# Patient Record
Sex: FEMALE | Race: BLACK OR AFRICAN AMERICAN | Employment: FULL TIME | URBAN - METROPOLITAN AREA
[De-identification: names, ages, dates, MRNs, and addresses within clinical notes are randomized per-mention and may not be internally consistent; named-entity substitution may affect disease eponyms.]

---

## 2020-09-17 ENCOUNTER — APPOINTMENT (OUTPATIENT)
Dept: CT IMAGING | Age: 50
End: 2020-09-17
Payer: COMMERCIAL

## 2020-09-17 ENCOUNTER — APPOINTMENT (OUTPATIENT)
Dept: CT IMAGING | Age: 50
DRG: 023 | End: 2020-09-17
Attending: NEUROLOGICAL SURGERY
Payer: COMMERCIAL

## 2020-09-17 ENCOUNTER — HOSPITAL ENCOUNTER (EMERGENCY)
Age: 50
Discharge: ANOTHER HOSPITAL | End: 2020-09-17
Attending: EMERGENCY MEDICINE
Payer: COMMERCIAL

## 2020-09-17 ENCOUNTER — HOSPITAL ENCOUNTER (INPATIENT)
Age: 50
LOS: 7 days | Discharge: HOME HEALTH | DRG: 023 | End: 2020-09-24
Attending: NEUROLOGICAL SURGERY | Admitting: INTERNAL MEDICINE
Payer: COMMERCIAL

## 2020-09-17 VITALS
SYSTOLIC BLOOD PRESSURE: 157 MMHG | DIASTOLIC BLOOD PRESSURE: 94 MMHG | WEIGHT: 220 LBS | BODY MASS INDEX: 36.65 KG/M2 | TEMPERATURE: 98 F | RESPIRATION RATE: 22 BRPM | OXYGEN SATURATION: 99 % | HEIGHT: 65 IN | HEART RATE: 85 BPM

## 2020-09-17 PROBLEM — I61.9 BRAIN BLEED (HCC): Status: ACTIVE | Noted: 2020-09-17

## 2020-09-17 LAB
ANION GAP SERPL CALCULATED.3IONS-SCNC: 9 MMOL/L (ref 3–16)
BASE EXCESS VENOUS: -2.2 MMOL/L (ref -3–3)
BASOPHILS ABSOLUTE: 0.1 K/UL (ref 0–0.2)
BASOPHILS RELATIVE PERCENT: 0.7 %
BUN BLDV-MCNC: 13 MG/DL (ref 7–20)
CALCIUM SERPL-MCNC: 9.1 MG/DL (ref 8.3–10.6)
CARBOXYHEMOGLOBIN: 2.2 % (ref 0–1.5)
CHLORIDE BLD-SCNC: 105 MMOL/L (ref 99–110)
CO2: 25 MMOL/L (ref 21–32)
CREAT SERPL-MCNC: 0.8 MG/DL (ref 0.6–1.1)
EOSINOPHILS ABSOLUTE: 0.1 K/UL (ref 0–0.6)
EOSINOPHILS RELATIVE PERCENT: 1.2 %
GFR AFRICAN AMERICAN: >60
GFR NON-AFRICAN AMERICAN: >60
GLUCOSE BLD-MCNC: 133 MG/DL (ref 70–99)
GLUCOSE BLD-MCNC: 138 MG/DL (ref 70–99)
HCO3 VENOUS: 21.7 MMOL/L (ref 23–29)
HCT VFR BLD CALC: 44.6 % (ref 36–48)
HEMOGLOBIN: 14.4 G/DL (ref 12–16)
INR BLD: 1.09 (ref 0.86–1.14)
LYMPHOCYTES ABSOLUTE: 3 K/UL (ref 1–5.1)
LYMPHOCYTES RELATIVE PERCENT: 36.5 %
MCH RBC QN AUTO: 27.4 PG (ref 26–34)
MCHC RBC AUTO-ENTMCNC: 32.3 G/DL (ref 31–36)
MCV RBC AUTO: 84.8 FL (ref 80–100)
METHEMOGLOBIN VENOUS: 0.2 %
MONOCYTES ABSOLUTE: 0.8 K/UL (ref 0–1.3)
MONOCYTES RELATIVE PERCENT: 10.1 %
NEUTROPHILS ABSOLUTE: 4.3 K/UL (ref 1.7–7.7)
NEUTROPHILS RELATIVE PERCENT: 51.5 %
O2 CONTENT, VEN: 18 VOL %
O2 SAT, VEN: 99 %
O2 THERAPY: ABNORMAL
PCO2, VEN: 33.8 MMHG (ref 40–50)
PDW BLD-RTO: 14.3 % (ref 12.4–15.4)
PERFORMED ON: ABNORMAL
PH VENOUS: 7.42 (ref 7.35–7.45)
PLATELET # BLD: 187 K/UL (ref 135–450)
PMV BLD AUTO: 9.4 FL (ref 5–10.5)
PO2, VEN: 128 MMHG (ref 25–40)
POTASSIUM REFLEX MAGNESIUM: 3.8 MMOL/L (ref 3.5–5.1)
PROTHROMBIN TIME: 12.6 SEC (ref 10–13.2)
RBC # BLD: 5.26 M/UL (ref 4–5.2)
SODIUM BLD-SCNC: 139 MMOL/L (ref 136–145)
TCO2 CALC VENOUS: 51 MMOL/L
TROPONIN: <0.01 NG/ML
WBC # BLD: 8.4 K/UL (ref 4–11)

## 2020-09-17 PROCEDURE — 85610 PROTHROMBIN TIME: CPT

## 2020-09-17 PROCEDURE — 99291 CRITICAL CARE FIRST HOUR: CPT

## 2020-09-17 PROCEDURE — 2580000003 HC RX 258: Performed by: STUDENT IN AN ORGANIZED HEALTH CARE EDUCATION/TRAINING PROGRAM

## 2020-09-17 PROCEDURE — 93005 ELECTROCARDIOGRAM TRACING: CPT | Performed by: EMERGENCY MEDICINE

## 2020-09-17 PROCEDURE — 82803 BLOOD GASES ANY COMBINATION: CPT

## 2020-09-17 PROCEDURE — 6370000000 HC RX 637 (ALT 250 FOR IP): Performed by: STUDENT IN AN ORGANIZED HEALTH CARE EDUCATION/TRAINING PROGRAM

## 2020-09-17 PROCEDURE — 2000000000 HC ICU R&B

## 2020-09-17 PROCEDURE — 89220 SPUTUM SPECIMEN COLLECTION: CPT

## 2020-09-17 PROCEDURE — 80048 BASIC METABOLIC PNL TOTAL CA: CPT

## 2020-09-17 PROCEDURE — 2580000003 HC RX 258

## 2020-09-17 PROCEDURE — 85025 COMPLETE CBC W/AUTO DIFF WBC: CPT

## 2020-09-17 PROCEDURE — 2580000003 HC RX 258: Performed by: EMERGENCY MEDICINE

## 2020-09-17 PROCEDURE — 36415 COLL VENOUS BLD VENIPUNCTURE: CPT

## 2020-09-17 PROCEDURE — 70450 CT HEAD/BRAIN W/O DYE: CPT

## 2020-09-17 PROCEDURE — 2500000003 HC RX 250 WO HCPCS: Performed by: EMERGENCY MEDICINE

## 2020-09-17 PROCEDURE — 6360000002 HC RX W HCPCS: Performed by: EMERGENCY MEDICINE

## 2020-09-17 PROCEDURE — 96367 TX/PROPH/DG ADDL SEQ IV INF: CPT

## 2020-09-17 PROCEDURE — 84484 ASSAY OF TROPONIN QUANT: CPT

## 2020-09-17 PROCEDURE — 2500000003 HC RX 250 WO HCPCS: Performed by: STUDENT IN AN ORGANIZED HEALTH CARE EDUCATION/TRAINING PROGRAM

## 2020-09-17 PROCEDURE — 96365 THER/PROPH/DIAG IV INF INIT: CPT

## 2020-09-17 PROCEDURE — 70496 CT ANGIOGRAPHY HEAD: CPT

## 2020-09-17 PROCEDURE — 71260 CT THORAX DX C+: CPT

## 2020-09-17 PROCEDURE — U0003 INFECTIOUS AGENT DETECTION BY NUCLEIC ACID (DNA OR RNA); SEVERE ACUTE RESPIRATORY SYNDROME CORONAVIRUS 2 (SARS-COV-2) (CORONAVIRUS DISEASE [COVID-19]), AMPLIFIED PROBE TECHNIQUE, MAKING USE OF HIGH THROUGHPUT TECHNOLOGIES AS DESCRIBED BY CMS-2020-01-R: HCPCS

## 2020-09-17 PROCEDURE — C9132 KCENTRA, PER I.U.: HCPCS | Performed by: EMERGENCY MEDICINE

## 2020-09-17 RX ORDER — LORAZEPAM 2 MG/ML
0.5 INJECTION INTRAMUSCULAR ONCE
Status: DISCONTINUED | OUTPATIENT
Start: 2020-09-17 | End: 2020-09-17 | Stop reason: HOSPADM

## 2020-09-17 RX ORDER — SODIUM CHLORIDE 9 MG/ML
50 INJECTION, SOLUTION INTRAVENOUS ONCE
Status: COMPLETED | OUTPATIENT
Start: 2020-09-17 | End: 2020-09-17

## 2020-09-17 RX ORDER — SODIUM CHLORIDE 0.9 % (FLUSH) 0.9 %
10 SYRINGE (ML) INJECTION EVERY 12 HOURS SCHEDULED
Status: DISCONTINUED | OUTPATIENT
Start: 2020-09-17 | End: 2020-09-21

## 2020-09-17 RX ORDER — ONDANSETRON 2 MG/ML
4 INJECTION INTRAMUSCULAR; INTRAVENOUS EVERY 6 HOURS PRN
Status: DISCONTINUED | OUTPATIENT
Start: 2020-09-17 | End: 2020-09-24 | Stop reason: HOSPADM

## 2020-09-17 RX ORDER — SODIUM CHLORIDE 9 MG/ML
INJECTION, SOLUTION INTRAVENOUS
Status: COMPLETED
Start: 2020-09-17 | End: 2020-09-17

## 2020-09-17 RX ORDER — ATORVASTATIN CALCIUM 80 MG/1
80 TABLET, FILM COATED ORAL NIGHTLY
Status: DISCONTINUED | OUTPATIENT
Start: 2020-09-17 | End: 2020-09-24 | Stop reason: HOSPADM

## 2020-09-17 RX ORDER — SODIUM CHLORIDE 0.9 % (FLUSH) 0.9 %
10 SYRINGE (ML) INJECTION PRN
Status: DISCONTINUED | OUTPATIENT
Start: 2020-09-17 | End: 2020-09-24 | Stop reason: HOSPADM

## 2020-09-17 RX ORDER — OXYCODONE HYDROCHLORIDE AND ACETAMINOPHEN 5; 325 MG/1; MG/1
1 TABLET ORAL EVERY 6 HOURS PRN
Status: DISCONTINUED | OUTPATIENT
Start: 2020-09-17 | End: 2020-09-22

## 2020-09-17 RX ORDER — ACETAMINOPHEN 325 MG/1
650 TABLET ORAL EVERY 4 HOURS PRN
Status: DISCONTINUED | OUTPATIENT
Start: 2020-09-17 | End: 2020-09-21

## 2020-09-17 RX ORDER — PROMETHAZINE HYDROCHLORIDE 25 MG/1
12.5 TABLET ORAL EVERY 6 HOURS PRN
Status: DISCONTINUED | OUTPATIENT
Start: 2020-09-17 | End: 2020-09-24 | Stop reason: HOSPADM

## 2020-09-17 RX ADMIN — SODIUM CHLORIDE 5 MG/HR: 9 INJECTION, SOLUTION INTRAVENOUS at 17:58

## 2020-09-17 RX ADMIN — SODIUM CHLORIDE 5 MG/HR: 9 INJECTION, SOLUTION INTRAVENOUS at 22:29

## 2020-09-17 RX ADMIN — Medication 10 ML: at 21:30

## 2020-09-17 RX ADMIN — PROTHROMBIN, COAGULATION FACTOR VII HUMAN, COAGULATION FACTOR IX HUMAN, COAGULATION FACTOR X HUMAN, PROTEIN C, PROTEIN S HUMAN, AND WATER 4990 UNITS: KIT at 18:19

## 2020-09-17 RX ADMIN — SODIUM CHLORIDE 50 ML: 9 INJECTION, SOLUTION INTRAVENOUS at 18:49

## 2020-09-17 RX ADMIN — LEVETIRACETAM 2000 MG: 100 INJECTION, SOLUTION INTRAVENOUS at 18:01

## 2020-09-17 RX ADMIN — ATORVASTATIN CALCIUM 80 MG: 80 TABLET, FILM COATED ORAL at 22:28

## 2020-09-17 RX ADMIN — ACETAMINOPHEN 650 MG: 325 TABLET ORAL at 22:28

## 2020-09-17 ASSESSMENT — PAIN DESCRIPTION - DESCRIPTORS: DESCRIPTORS: HEADACHE

## 2020-09-17 ASSESSMENT — PAIN DESCRIPTION - ONSET: ONSET: GRADUAL

## 2020-09-17 ASSESSMENT — PAIN SCALES - GENERAL
PAINLEVEL_OUTOF10: 0
PAINLEVEL_OUTOF10: 3

## 2020-09-17 ASSESSMENT — PAIN DESCRIPTION - PROGRESSION: CLINICAL_PROGRESSION: GRADUALLY WORSENING

## 2020-09-17 ASSESSMENT — PAIN DESCRIPTION - LOCATION: LOCATION: HEAD

## 2020-09-17 ASSESSMENT — PAIN DESCRIPTION - PAIN TYPE: TYPE: ACUTE PAIN

## 2020-09-17 ASSESSMENT — PAIN DESCRIPTION - FREQUENCY: FREQUENCY: CONTINUOUS

## 2020-09-17 ASSESSMENT — PAIN - FUNCTIONAL ASSESSMENT: PAIN_FUNCTIONAL_ASSESSMENT: ACTIVITIES ARE NOT PREVENTED

## 2020-09-17 NOTE — ED NOTES
Stroke alert called at [facility] box. Pt states weakness/numbness on right side started 2 hours ago.      Agueda Levine RN  09/17/20 8805

## 2020-09-17 NOTE — PLAN OF CARE
I have spoken with the referring physician from [facility] ED regarding this patient and have reviewed the chart and images. Imaging shows a very small, 9mm intraparenchymal left frontal hemorrhage. This does explain her right hand numbness and, due to her use of Xarelto, is at risk for enlargement. Briefly, patient is appropriate for admission for observation of a spontaneous intracerebral hemorrhage. The immediate plan of care will involve   -transfer to [facility] ICU under care of Intensivist  -treat with PCCs to reverse anticoagulation  -repeat CT head without contrast in 6h to rule out enlargement  -re-engage Neurosurgery for any change in mental status, level of alertness, change in motor exam or significant enlargement of hemorrhage on CT.

## 2020-09-17 NOTE — ED NOTES
Per pt she had an episode last night where she felt like she was swallowing her tongue and then this morning for about an hour at work she had intermittent right arm numbness and weakness with trouble speaking. Symptoms had resolved prior to coming to emergency department. Pt denies any complaints. Pt states she takes xarelto  for history of blood clots in her neck and arm. Pt also states she has been experiencing high blood pressure lately, but has not been prescribed any medications for that. Pt does take a water pill as needed for leg swelling.      Chandler Brothers RN  09/17/20 [address] [place] [address] 399, RN  09/17/20 8412

## 2020-09-17 NOTE — ED PROVIDER NOTES
[facility]        Pt Name: Marietta Oden  MRN: [id number]  [place] 1970  Date of evaluation: 9/17/2020  Provider: Quin David PA-C  PCP: Anish Pearce     I have seen and evaluated this patient with my supervising physician Tiesha Clarke MD.    [facility]       Chief Complaint   Patient presents with    Other     Pt states last night she felt like she was swallowing her tongue and then at work intermittently for an hour was having numbness in her right arm and trouble talking. No deficits noted upon arrival to ED. Stroke alert called from        HISTORY OF PRESENT ILLNESS   (Location, Timing/Onset, Context/Setting, Quality, Duration, Modifying Factors, Severity, Associated Signs and Symptoms)  Note limiting factors. Marietta Oden is a 48 y.o. female presents the emergency department via private transportation in regards to some ongoing difficulties at a been worsening over the past 24 hours. It is reported last night the patient was having difficulties as it pertains to some stuttering\" feeling off. It is reported that she had associated symptoms of this that were somewhat transient. She also had associated dysesthesias in the region of her left hand at the time that this occurred. It is reported that when this occurred last evening it was relatively short-lived. She did not really think much of it. It is reported that she was able to go to work today and today at approximately 1400 hrs. had similar presentation. It is reported that she did have some word searching difficulties. Did not have difficulties as a pertains to stuttering that occurred today even though it occurred last night and had similar dysesthesias in her hand. Patient states that this occurred when she was on the phone. It is reported that she might was mildly confused as well.   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 It is reported that she is not currently experiencing any significant headache pain. She is able to walk under her own power and presents to the emerge department because of the above-mentioned. With the patient was seen and evaluated at the triage desk because of the onset of her symptoms she had been stroke alerted by nursing personnel before the above-mentioned information could be obtained. Because of the acuity of the above-mentioned much of the information is obtained after the fact. Nursing Notes were all reviewed and agreed with or any disagreements were addressed in the HPI. REVIEW OF SYSTEMS    (2-9 systems for level 4, 10 or more for level 5)     Review of Systems   Unable to perform ROS: Acuity of condition       Positives and Pertinent negatives as per HPI. Except as noted above in the ROS, all other systems were reviewed and negative. PAST MEDICAL HISTORY   History reviewed. No pertinent past medical history. SURGICAL HISTORY     Past Surgical History:   Procedure Laterality Date    CHOLECYSTECTOMY           CURRENTMEDICATIONS       Discharge Medication List as of 9/17/2020  7:20 PM      CONTINUE these medications which have NOT CHANGED    Details   rivaroxaban (XARELTO) 20 MG TABS tablet TAKE ONE TABLET BY MOUTH DAILY WITH DINNERHistorical Med      norethindrone-ethinyl estradiol (Pilekrogen 53 7/7/7) 0.5/0.75/1-35 MG-MCG per tablet Take 1 tablet by mouth daily. ALLERGIES     Patient has no known allergies. FAMILYHISTORY     History reviewed. No pertinent family history.        SOCIAL HISTORY       Social History     Tobacco Use    Smoking status: Never Smoker    Smokeless tobacco: Never Used   Substance Use Topics    Alcohol use: Yes     Comment: occ    Drug use: Yes     Types: Marijuana       SCREENINGS   NIH Stroke Scale  NIH Stroke Scale Assessed: Yes  Interval: Baseline  Level of Consciousness (1a. ): Alert  LOC Questions (1b. ): Answers both correctly  LOC Commands (1c. ): Performs both tasks correctly  Best Gaze (2. ): Normal  Visual (3. ): No visual loss  Facial Palsy (4. ): Normal symmetrical movement  Motor Arm, Left (5a. ): No drift  Motor Arm, Right (5b. ): No drift  Motor Leg, Left (6a. ): No drift  Motor Leg, Right (6b. ): No drift  Limb Ataxia (7. ): Absent  Sensory (8. ): Normal  Best Language (9. ): No aphasia  Dysarthria (10. ): Normal  Extinction and Inattention (11): No abnormality  Total: 0Glasgow Coma Scale  Eye Opening: Spontaneous  Best Verbal Response: Oriented  Best Motor Response: Obeys commands  Homar Coma Scale Score: 15        PHYSICAL EXAM    (up to 7 for level 4, 8 or more for level 5)     ED Triage Vitals [09/17/20 1723]   BP Temp Temp src Pulse Resp SpO2 Height Weight   (!) 179/88 98 °F (36.7 °C) -- 90 20 100 % 5' 5\" (1.651 m) 220 lb (99.8 kg)       Physical Exam  Vitals signs and nursing note reviewed. Constitutional:       General: She is not in acute distress. Appearance: Normal appearance. She is well-developed. She is not ill-appearing or diaphoretic. HENT:      Head: Normocephalic and atraumatic. No raccoon eyes, Jiang's sign, contusion or laceration. Right Ear: Hearing, tympanic membrane, ear canal and external ear normal.      Left Ear: Hearing, tympanic membrane, ear canal and external ear normal.      Nose: Nose normal.      Mouth/Throat:      Lips: Pink. Mouth: Mucous membranes are moist.   Eyes:      General: No scleral icterus. Right eye: No discharge. Left eye: No discharge. Extraocular Movements:      Right eye: No nystagmus. Left eye: No nystagmus. Conjunctiva/sclera: Conjunctivae normal.      Pupils: Pupils are equal, round, and reactive to light. Neck:      Musculoskeletal: Normal range of motion. Vascular: No JVD. Cardiovascular:      Rate and Rhythm: Normal rate and regular rhythm. Heart sounds:  No hx pe, anticoa ro pe TECHNOLOGIST PROVIDED HISTORY: Reason for exam:->sob, cp, hx pe, anticoa ro pe Reason for Exam: pe? Acuity: Acute Type of Exam: Initial FINDINGS: Motion artifact decreases sensitivity and specificity of the study. There is a focal nodular density marginating the inferior lobe of the thyroid on the left measuring 1.4 cm. No specific imaging follow-up recommended based on size. No intimal flap seen in aorta. .  No pericardial effusion There is nonspecific thickening at the GE junction. No embolus is seen in the central, right, or left main pulmonary artery. No embolus is seen on the right or left, least to the subsegmental level. Respiratory motion artifact limits evaluation of fine pulmonary parenchymal change. No pneumonia. No edema. No pleural effusion. Mosaic attenuation is seen at the lung bases, suggesting small airways disease or air trapping. No pneumothorax identified Bandlike opacity seen in the right middle lobe and lingula. Bandlike morphology favors subsegmental atelectasis or scar Adrenal glands appear normal.  There are clips from prior cholecystectomy. There is subtle lobular contour to the liver. No pericardial effusion. Small hiatal hernia seen. Motion limited. No central pulmonary embolus identified. No pneumonia or edema. No pneumothorax. Cta Head Neck W Contrast    Result Date: 9/17/2020  EXAMINATION: CTA OF THE HEAD AND NECK WITH CONTRAST 9/17/2020 5:10 pm: TECHNIQUE: CTA of the head and neck was performed with the administration of intravenous contrast. Multiplanar reformatted images are provided for review. MIP images are provided for review. Stenosis of the internal carotid arteries measured using NASCET criteria. Dose modulation, iterative reconstruction, and/or weight based adjustment of the mA/kV was utilized to reduce the radiation dose to as low as reasonably achievable. COMPARISON: None.  HISTORY: ORDERING SYSTEM PROVIDED HISTORY: dysarthria, weakness ro hand ro lvo TECHNOLOGIST PROVIDED HISTORY: Reason for exam:->dysarthria, weakness ro hand ro lvo Reason for Exam: dysarthria, weakness ro hand ro lvo Acuity: Acute Type of Exam: Initial FINDINGS: CTA NECK: AORTIC ARCH/ARCH VESSELS: No dissection or arterial injury. No significant stenosis of the brachiocephalic or subclavian arteries. CAROTID ARTERIES: No dissection, arterial injury, or hemodynamically significant stenosis by NASCET criteria. VERTEBRAL ARTERIES: No dissection, arterial injury, or significant stenosis. SOFT TISSUES: The lung apices are clear. No cervical or superior mediastinal lymphadenopathy. The larynx and pharynx are unremarkable. No acute abnormality of the salivary and thyroid glands. BONES: No acute osseous abnormality. CTA HEAD: ANTERIOR CIRCULATION: No significant stenosis of the intracranial internal carotid, anterior cerebral, or middle cerebral arteries. No aneurysm. POSTERIOR CIRCULATION: No significant stenosis of the vertebral, basilar, or posterior cerebral arteries. No aneurysm. OTHER: No dural venous sinus thrombosis on this non-dedicated study. BRAIN: See separately dictated noncontrast head CT report. No flow limiting stenosis or large vessel occlusion visualized within the head or neck. No cerebral aneurysm or AVM visualized. PROCEDURES   Unless otherwise noted below, none     Procedures    CRITICAL CARE TIME   Because of the high probability of sudden clinical deterioration of the patients condition and to prevent further deterioration, my critical care time involved my full attention to the patients condition, and included chart data review, documentation, medication ordering, viewing the patients old records, reevaluation of the patient's cardiac, pulmonary, and neurological status. Reassessing vital signs. Consutlations with off service physician. Ordering, interpreting reviewing diagnostic testing.  Therefore my critical care time was 43 minutes of direct attention to the patients condition and did not include time spent on procedures. CONSULTS:  None      EMERGENCY DEPARTMENT COURSE and DIFFERENTIAL DIAGNOSIS/MDM:   Vitals:    Vitals:    09/17/20 1815 09/17/20 1830 09/17/20 1845 09/17/20 1900   BP: (!) 172/93 (!) 150/80 (!) 146/78 (!) 157/94   Pulse: 84 89 91 85   Resp: 19 24 24 22   Temp:       SpO2: 100% 100% 99% 99%   Weight:       Height:           Patient was given the following medications:  Medications   LORazepam (ATIVAN) injection 0.5 mg (0.5 mg Intravenous Not Given 9/17/20 1731)   iopamidol (ISOVUE-370) 76 % injection 75 mL (has no administration in time range)   iopamidol (ISOVUE-370) 76 % injection 75 mL (has no administration in time range)   niCARdipine (CARDENE) 25 mg in sodium chloride 0.9 % 250 mL infusion (0 mg/hr Intravenous Patient Transferred to Other Facility 9/17/20 1916)   levETIRAcetam (KEPPRA) 2,000 mg in sodium chloride 0.9 % 100 mL IVPB (0 mg Intravenous Stopped 9/17/20 1819)   prothrombin complex concentrate (human) (KCENTRA) infusion 4,990 Units (0 Units Intravenous Stopped 9/17/20 1849)   0.9 % sodium chloride infusion (0 mLs Intravenous Stopped 9/17/20 1916)           The patient's detailed history of present illness is documented as above. Upon arrival to the emergency department the patient's vital signs are as documented. The patient is noted to be hemodynamically stable and afebrile. Physical examination findings are as above. Patient was seen and evaluated by the attending physician as the patient was stroke alerted. Her NIHSS score was 0. Protocol was initiated. CBC demonstrates no evidence of leukocytosis anemia and/or thrombocytopenia. BUN is 13 creatinine 0.8 nonfasting glucose 138 electrolytes normal.  Troponin less than 0.01. INR is 1.09. Venous blood gas is normal pH. Fasting glucose was 133.   The attending physician did speak directly with the stroke team and the patient is not a candidate for TPA because she is on Xarelto anticoagulation. CT of the head does demonstrate a 9 mm focus of acute intercranial hemorrhage in the left frontal lobe. CTAs are normal and finding. CT of the chest demonstrates no evidence of pulmonary embolism. When the above-mentioned was noted a telephone call was placed to neurosurgery at [facility]. Case was discussed with Dr. Mary Gallego. He accepts the patient for transfer to the intensive care unit at [facility] for ongoing care management of the patient's intracranial hemorrhage. He is aware that here in the emergency department the patient has been reversed with Kcentra. She has been given Keppra as well as Ativan. Because of her hypertension she was also placed on a Cardene drip. She will require MICU transfer. Case was then discussed directly with [facility] intensivist.  Patient be transferred to [facility] for ongoing care management the diagnoses below. Initially there appeared to be some confusion with the transfer center. Requested repeat page to the intensivist which had been requested by the neurosurgeon. Telephone call was placed to the attending physician respectively with the charge nurse and confirms that the patient can be transferred and that the intensivists will receive the patient upon transfer. FINAL IMPRESSION      1. Spontaneous intracranial hemorrhage ([address]    2.  Adequate anticoagulation on anticoagulant therapy          DISPOSITION/PLAN   DISPOSITION: Transfer to [facility]         (Please note that portions of this note were completed with a voice recognition program.  Efforts were made to edit the dictations but occasionally words are mis-transcribed.)    Donald Guerrier PA-C (electronically signed)           Isaias Shannon PA-C  09/17/20 1945

## 2020-09-17 NOTE — ED PROVIDER NOTES
As physician-in-triage, I performed a medical screening history and physical exam on [employer]. I also cared for and evaluated the patient in conjunction with the ED Advanced Practice Provider. All diagnostic, treatment, and disposition decisions were made by myself in conjunction with the advanced practice provider. For all further details of the patient's emergency department visit, please see the advanced practice provider's documentation. 51-year-old black female right-hand-dominant, who had 2 episodes of word searching deficit denies any headache complained of some mild dysarthria and right hand paresthesias initially began at 2400 hrs. last night and then again today on 9/17/2020 1400 hrs. 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  She is right-hand dominant. She did complain of some mild shortness of breath. No fever no seizures. No known mass lesions. GCS is a 15 on arrival she ambulates with no distress no word finding deficit, no nystagmus no truncal ataxia no drift or sensation abnormality. NIH stroke scale currently is a 0. Emergent CT of the head reveals a 9 mm small intraparenchymal left frontal hematoma, hemorrhage. Consultation with neurosurgery is pending. She is receiving 2 g of Keppra Kcentra, nicardipine infusion due to a blood pressure above 180/90 to titrate to goal to 160 below 160/80. Total critical care time provided today was 35 procedures and family discussion time. Critical care time provided for obtaining history, conducting a physical exam, performing and monitoring interventions, ordering, collecting and interpreting tests, and establishing medical decision-making. There was a potential for life/limb threatening pathology requiring close evaluation and intervention with concern for patient decompensation. Impression: Intracranial, intraparenchymal hemorrhage left frontal lobe. Chronic anticoagulation. Right hand paresthesias dysarthria transient.        Bebo Butterfield MD  03/19/54 3980

## 2020-09-17 NOTE — ED NOTES
Report given to [facility] at bedside. Pt shows no s/s of distress at time of transfer. Pt to be transported to [facility] ICU 4516. Cardene infusing at time of transfer.      Mague Barry RN  09/17/20 2733

## 2020-09-18 ENCOUNTER — APPOINTMENT (OUTPATIENT)
Dept: MRI IMAGING | Age: 50
DRG: 023 | End: 2020-09-18
Attending: NEUROLOGICAL SURGERY
Payer: COMMERCIAL

## 2020-09-18 LAB
ANION GAP SERPL CALCULATED.3IONS-SCNC: 10 MMOL/L (ref 3–16)
BUN BLDV-MCNC: 9 MG/DL (ref 7–20)
CALCIUM SERPL-MCNC: 8.8 MG/DL (ref 8.3–10.6)
CHLORIDE BLD-SCNC: 105 MMOL/L (ref 99–110)
CO2: 22 MMOL/L (ref 21–32)
CREAT SERPL-MCNC: 0.6 MG/DL (ref 0.6–1.1)
EKG ATRIAL RATE: 80 BPM
EKG DIAGNOSIS: NORMAL
EKG P AXIS: 72 DEGREES
EKG P-R INTERVAL: 152 MS
EKG Q-T INTERVAL: 378 MS
EKG QRS DURATION: 84 MS
EKG QTC CALCULATION (BAZETT): 435 MS
EKG R AXIS: 16 DEGREES
EKG T AXIS: 28 DEGREES
EKG VENTRICULAR RATE: 80 BPM
GFR AFRICAN AMERICAN: >60
GFR NON-AFRICAN AMERICAN: >60
GLUCOSE BLD-MCNC: 110 MG/DL (ref 70–99)
HCT VFR BLD CALC: 43.2 % (ref 36–48)
HEMOGLOBIN: 14.2 G/DL (ref 12–16)
MCH RBC QN AUTO: 27.7 PG (ref 26–34)
MCHC RBC AUTO-ENTMCNC: 33 G/DL (ref 31–36)
MCV RBC AUTO: 83.9 FL (ref 80–100)
PDW BLD-RTO: 14.5 % (ref 12.4–15.4)
PLATELET # BLD: 175 K/UL (ref 135–450)
PMV BLD AUTO: 8.7 FL (ref 5–10.5)
POTASSIUM SERPL-SCNC: 3.6 MMOL/L (ref 3.5–5.1)
RBC # BLD: 5.14 M/UL (ref 4–5.2)
SARS-COV-2, PCR: NOT DETECTED
SODIUM BLD-SCNC: 137 MMOL/L (ref 136–145)
WBC # BLD: 8 K/UL (ref 4–11)

## 2020-09-18 PROCEDURE — 6360000004 HC RX CONTRAST MEDICATION: Performed by: NEUROLOGICAL SURGERY

## 2020-09-18 PROCEDURE — 97162 PT EVAL MOD COMPLEX 30 MIN: CPT

## 2020-09-18 PROCEDURE — 1200000000 HC SEMI PRIVATE

## 2020-09-18 PROCEDURE — 97530 THERAPEUTIC ACTIVITIES: CPT

## 2020-09-18 PROCEDURE — 6370000000 HC RX 637 (ALT 250 FOR IP): Performed by: STUDENT IN AN ORGANIZED HEALTH CARE EDUCATION/TRAINING PROGRAM

## 2020-09-18 PROCEDURE — 2580000003 HC RX 258: Performed by: STUDENT IN AN ORGANIZED HEALTH CARE EDUCATION/TRAINING PROGRAM

## 2020-09-18 PROCEDURE — 93010 ELECTROCARDIOGRAM REPORT: CPT | Performed by: INTERNAL MEDICINE

## 2020-09-18 PROCEDURE — 6370000000 HC RX 637 (ALT 250 FOR IP): Performed by: INTERNAL MEDICINE

## 2020-09-18 PROCEDURE — A9576 INJ PROHANCE MULTIPACK: HCPCS | Performed by: NEUROLOGICAL SURGERY

## 2020-09-18 PROCEDURE — 80061 LIPID PANEL: CPT

## 2020-09-18 PROCEDURE — 85027 COMPLETE CBC AUTOMATED: CPT

## 2020-09-18 PROCEDURE — 80048 BASIC METABOLIC PNL TOTAL CA: CPT

## 2020-09-18 PROCEDURE — 97116 GAIT TRAINING THERAPY: CPT

## 2020-09-18 PROCEDURE — 97165 OT EVAL LOW COMPLEX 30 MIN: CPT

## 2020-09-18 PROCEDURE — 70553 MRI BRAIN STEM W/O & W/DYE: CPT

## 2020-09-18 PROCEDURE — 36415 COLL VENOUS BLD VENIPUNCTURE: CPT

## 2020-09-18 PROCEDURE — 99222 1ST HOSP IP/OBS MODERATE 55: CPT | Performed by: INTERNAL MEDICINE

## 2020-09-18 PROCEDURE — 97535 SELF CARE MNGMENT TRAINING: CPT

## 2020-09-18 RX ORDER — METOPROLOL SUCCINATE 50 MG/1
25 TABLET, EXTENDED RELEASE ORAL DAILY
Status: DISCONTINUED | OUTPATIENT
Start: 2020-09-18 | End: 2020-09-24 | Stop reason: HOSPADM

## 2020-09-18 RX ORDER — LEVETIRACETAM 500 MG/1
500 TABLET ORAL 2 TIMES DAILY
Status: DISCONTINUED | OUTPATIENT
Start: 2020-09-18 | End: 2020-09-24 | Stop reason: HOSPADM

## 2020-09-18 RX ADMIN — GADOTERIDOL 20 ML: 279.3 INJECTION, SOLUTION INTRAVENOUS at 16:17

## 2020-09-18 RX ADMIN — LEVETIRACETAM 500 MG: 500 TABLET, FILM COATED ORAL at 20:55

## 2020-09-18 RX ADMIN — OXYCODONE HYDROCHLORIDE AND ACETAMINOPHEN 1 TABLET: 5; 325 TABLET ORAL at 00:58

## 2020-09-18 RX ADMIN — Medication 10 ML: at 20:56

## 2020-09-18 RX ADMIN — METOPROLOL SUCCINATE 25 MG: 50 TABLET, EXTENDED RELEASE ORAL at 13:46

## 2020-09-18 RX ADMIN — LEVETIRACETAM 500 MG: 500 TABLET, FILM COATED ORAL at 13:46

## 2020-09-18 RX ADMIN — Medication 10 ML: at 10:02

## 2020-09-18 RX ADMIN — ATORVASTATIN CALCIUM 80 MG: 80 TABLET, FILM COATED ORAL at 20:55

## 2020-09-18 ASSESSMENT — PAIN SCALES - GENERAL
PAINLEVEL_OUTOF10: 0
PAINLEVEL_OUTOF10: 0
PAINLEVEL_OUTOF10: 7
PAINLEVEL_OUTOF10: 0

## 2020-09-18 ASSESSMENT — PAIN DESCRIPTION - ONSET: ONSET: GRADUAL

## 2020-09-18 ASSESSMENT — PAIN DESCRIPTION - FREQUENCY: FREQUENCY: CONTINUOUS

## 2020-09-18 ASSESSMENT — PAIN DESCRIPTION - PAIN TYPE: TYPE: ACUTE PAIN

## 2020-09-18 ASSESSMENT — PAIN DESCRIPTION - DESCRIPTORS: DESCRIPTORS: HEADACHE

## 2020-09-18 ASSESSMENT — PAIN - FUNCTIONAL ASSESSMENT: PAIN_FUNCTIONAL_ASSESSMENT: ACTIVITIES ARE NOT PREVENTED

## 2020-09-18 ASSESSMENT — PAIN DESCRIPTION - LOCATION: LOCATION: HEAD

## 2020-09-18 ASSESSMENT — PAIN DESCRIPTION - PROGRESSION: CLINICAL_PROGRESSION: GRADUALLY WORSENING

## 2020-09-18 NOTE — PROGRESS NOTES
Speech Language Pathology - HOLD    BSE order received, chart reviewed. Per chart review, passed 3 oz water screen last night per nursing documentation. RN, Nickolas Busby, reports no neuro impairments at this time. Pt remains in [facility] r/o. In an effort to preserve PPE will hold eval until out of r/o or until dysphagia or speech/language impairments are noted again.      Sonya Pretty M.A., [facility]  Speech-Language Pathologist  Pager [phone]

## 2020-09-18 NOTE — PROGRESS NOTES
Patient admitted to ICU 4516, A&O x4 on RA with belongings via ground transport. Patient denies HA, SOB or pain at this time. Cardene infusing. Patient placed on ICU monitor, CHG bath given and 4 eyes performed. Residents notified of patient's arrival. Safety precautions in place. Will continue to monitor.

## 2020-09-18 NOTE — CONSULTS
Clinical Pharmacy Consult Note    48 y.o. female admitted with stroke. Pharmacy has been asked by Dr. Domenico Pratt  to adjust all drips to normal saline as appropriate based on compatibility to avoid fluid shifts since D5 is osmotically active. The following intermittent IV drips/infusions have been adjusted to saline:  N/A- all IV meds in NS currently    The following medications must remain in D5W due to incompatibility with normal saline:  Amphotericin  Mycophenolate  Nitroprusside  Penicillin G Potassium    Please be aware that patient has D5W ordered as part of hypoglycemia orderset. Total IV fluid delivered to patient over last 24h: Will review tomorrow    [facility] will follow daily to ensure all new IVPBs + drips are in NS    Please call with questions:  [phone] ([phone]) [phone] ([address] [place] [address])    Thank you for the consult  Qasim JOHNSON  9/17/2020    9:23 PM

## 2020-09-18 NOTE — PROGRESS NOTES
No events this shift. MRI complete. Awaiting covid results. VSS. Up ad claude. Neurologically intact.

## 2020-09-18 NOTE — FLOWSHEET NOTE
09/18/20 1350   Encounter Summary   Services provided to: Patient   Continue Visiting   (es 9/18)   Complexity of Encounter High   Length of Encounter 15 minutes   Advance Care Planning Yes

## 2020-09-18 NOTE — PROGRESS NOTES
Patient sat upright to 90 degrees, given 3 oz of water which was consumed in sequential swallows without swallowing. The patient did not cough, choke, clear throat or speak in wet voice during or immediately after the completion of drinking. 3 oz water swallow screen passed.

## 2020-09-18 NOTE — PROGRESS NOTES
Clinical Pharmacy Consult Note    48 y.o. female admitted with [address] frontal lobe. Pharmacy has been asked by Dr. Jennifer Willoughby  to adjust all drips to normal saline as appropriate based on compatibility to avoid fluid shifts since D5 is osmotically active. The following intermittent IV drips/infusions have been adjusted to saline:  None at this time      Please be aware that patient may have D5W ordered as part of hypoglycemia orderset. Total IV fluid delivered to patient over last 24h:  444 mL    [facility] will follow daily to ensure all new IVPBs + drips are in NS      Thank you, please call with questions.    Tori WilsonD., BCPS   9/18/2020 4:12 PM  Wireless: [phone]

## 2020-09-18 NOTE — PROGRESS NOTES
Progress Note        Date:2020       Room:4516/4516-01  Patient Name:Merry Kim     YOB: 1970     Age:50 y.o. Dysarthria, numbness          Subjective   Interval History Status: improved. Denies any HA, nausea, vomiting  Numbness improved. Speech improved. Review of Systems   ROS as mentioned above. Medications   Scheduled Meds:    sodium chloride flush  10 mL Intravenous 2 times per day    atorvastatin  80 mg Oral Nightly     Continuous Infusions:    niCARdipine Stopped (20 0113)     PRN Meds: sodium chloride flush, acetaminophen, promethazine **OR** ondansetron, oxyCODONE-acetaminophen    Past History    Past Medical History:   has no past medical history on file. Social History:   reports that she has never smoked. She has never used smokeless tobacco. She reports current alcohol use. She reports current drug use. Drug: Marijuana. Family History: No family history on file. Physical Examination      Vitals:  BP (!) 151/92   Pulse 76   Temp 99.1 °F (37.3 °C) (Oral)   Resp 23   Ht 5' 5\" (1.651 m)   Wt 227 lb 15.3 oz (103.4 kg)   SpO2 96%   BMI 37.93 kg/m²   Temp (24hrs), Av.5 °F (36.9 °C), Min:98 °F (36.7 °C), Max:99.1 °F (37.3 °C)      I/O (24Hr): Intake/Output Summary (Last 24 hours) at 2020 1311  Last data filed at 2020 0600  Gross per 24 hour   Intake 444 ml   Output 1100 ml   Net -656 ml       Physical Exam  Constitutional:       Appearance: Normal appearance. Neck:      Musculoskeletal: Normal range of motion and neck supple. Cardiovascular:      Rate and Rhythm: Normal rate and regular rhythm. Pulses: Normal pulses. Heart sounds: Normal heart sounds. Pulmonary:      Effort: Pulmonary effort is normal.      Breath sounds: Normal breath sounds. Abdominal:      General: Abdomen is flat. Palpations: Abdomen is soft. Musculoskeletal: Normal range of motion. General: No swelling or tenderness. Skin:     General: Skin is warm and dry. Capillary Refill: Capillary refill takes less than 2 seconds. Neurological:      General: No focal deficit present. Mental Status: She is alert and oriented to person, place, and time. Psychiatric:         Mood and Affect: Mood normal.         Behavior: Behavior normal.           Labs/Imaging/Diagnostics   Labs:  CBC:   Recent Labs     09/17/20 1729 09/18/20  0320   WBC 8.4 8.0   HGB 14.4 14.2   HCT 44.6 43.2   MCV 84.8 83.9    175     BMP:   Recent Labs     09/17/20 1729 09/18/20  0320    137   K 3.8 3.6    105   CO2 25 22   BUN 13 9   CREATININE 0.8 0.6     Mag: No results found for: MG  LIVER PROFILE: No results for input(s): AST, ALT, LIPASE, BILIDIR, BILITOT, ALKPHOS in the last 72 hours. Invalid input(s): AMYLASE,  ALB  PT/INR:   Recent Labs     09/17/20 1729   PROTIME 12.6   INR 1.09     APTT: No results for input(s): APTT in the last 72 hours. BNP:  No results for input(s): BNP in the last 72 hours. CARDIAC ENZYMES:   Recent Labs     09/17/20 1729   TROPONINI <0.01         Imaging Last 24 Hours:  CT head without contrast   Final Result       No significant interval change in an 8 mm ill-defined hyperdense lesion    within the left frontal lobe, at the gray matter white matter junction. Differential diagnosis includes intraparenchymal hemorrhagic contusion    secondary to trauma, hemorrhagic metastasis, or cavernous malformation. MRI BRAIN W WO CONTRAST    (Results Pending)           Assessment      Active Problems:    Brain bleed ([address]  Resolved Problems:    * No resolved hospital problems. *       Plan:        [address]  Was on xarelto   Xarelto on hold  NS consulted  No focal deficits noted. S/p Kcentra. Keppra per NS. Monitor BP off Nicardipine gtt , was taken off at 1 am.      Hx of thrombophilia  Has seen  Hematology in past.  ?protein s def  Consult Hematology for anticoagulation recs moving forward. Called Dr Rosette Verdugo.          Piotr Dong

## 2020-09-18 NOTE — H&P
Francisco Turcios, BLOODU, [address], [address] [address]    Radiology:     CXR: I have reviewed the CXR with the following interpretation:   EKG:  I have reviewed the EKG with the following interpretation:     CT head without contrast   Final Result       No significant interval change in an 8 mm ill-defined hyperdense lesion    within the left frontal lobe, at the gray matter white matter junction. Differential diagnosis includes intraparenchymal hemorrhagic contusion    secondary to trauma, hemorrhagic metastasis, or cavernous malformation. ASSESSMENT & PLAN:  Manohar Vargas is a 48 y.o. female w/ PMHx of DVT and PE (on Xarelto) presented to [facility] with chief complain of speech difficulty, numbness, and tingling of the right hand.      Spontaneous intracranial hemorrhage:   - CT head:  9 mm focus of acute intracranial hemorrhage in the left frontal lobe. - She received 2 g of Keppra, Kcentra, nicardipine ggt due to a blood pressure for her high blood pressure.  - Neuro check q 1 hr  - Nicardipine ggt   - Lipitor 80 mg  - BP < 160  - repeat CT head at 11 PM  - Neurosurgery consult  - Elevate head of the bed  - Hold antiplatelet and anti coaugulation  - PT/OT     Hypertensive emergency: Pt presented with BP above 179/88. CT head:  9 mm focus of acute intracranial hemorrhage in the left frontal lobe.    - Tele monitor  - Nicardipine ggt  - Maintain BP <160    DVTProphylaxis: SCD  Diet: Diet NPO Effective Now  Code Status: Full Code    I will discuss the patient with MD Duane Ybarra MD  Internal Medicine Resident, PGY-1  Contact via [address] [place] [address]

## 2020-09-18 NOTE — CONSULTS
have been marked as taking for the 9/17/20 encounter [facility] Encounter). Current Facility-Administered Medications   Medication Dose Route Frequency Provider Last Rate Last Dose    niCARdipine (CARDENE) 25 mg in sodium chloride 0.9 % 250 mL infusion  5 mg/hr Intravenous Continuous Carri Ash MD   Stopped at 09/18/20 0113    sodium chloride flush 0.9 % injection 10 mL  10 mL Intravenous 2 times per day Carri Ash MD   10 mL at 09/18/20 1002    sodium chloride flush 0.9 % injection 10 mL  10 mL Intravenous PRN Carri Ash MD        acetaminophen (TYLENOL) tablet 650 mg  650 mg Oral Q4H PRN Carri Ash MD   650 mg at 09/17/20 2228    promethazine (PHENERGAN) tablet 12.5 mg  12.5 mg Oral Q6H PRN Carri Ash MD        Or    [facility] injection 4 mg  4 mg Intravenous Q6H PRN Carri Ash MD        atorvastatin (LIPITOR) tablet 80 mg  80 mg Oral Nightly Carri Ash MD   80 mg at 09/17/20 2228    oxyCODONE-acetaminophen (PERCOCET) 5-325 MG per tablet 1 tablet  1 tablet Oral Q6H PRN Nolan Holm MD   1 tablet at 09/18/20 0058      Objective:  BP (!) 142/92   Pulse 67   Temp 98.3 °F (36.8 °C) (Oral)   Resp 16   Ht 5' 5\" (1.651 m)   Wt 227 lb 15.3 oz (103.4 kg)   SpO2 98%   BMI 37.93 kg/m²     Physical Exam:  Patient was seen during Covid-19 pandemic and all efforts made to respect recommendations of social distancing and decrease PPE have been made. Unable to perform face to face examination of the patient. Exam taken from nurse. Radiological Findings:  CT head wo contrast   9/17/2020 1711  There is a small focus of high density measuring 9 mm in the left frontal lobe at the gray-white junction. No evidence of intracranial hemorrhage elsewhere or evidence of acute intracranial process elsewhere. Derril Olympia   No midline shift or hydrocephalus       CT head wo contrast   9/17/2020 2335  No significant interval change in an 8 mm ill-defined hyperdense lesion within the left frontal lobe, at the gray matter white matter junction. Differential diagnosis includes intraparenchymal hemorrhagic contusion secondary to trauma, hemorrhagic metastasis, or cavernous malformation. Labs  Recent Labs     09/17/20  1729 09/18/20  0320    137    105   CO2 25 22   BUN 13 9   CREATININE 0.8 0.6   GLUCOSE 138* 110*     Recent Labs     09/17/20  1729 09/18/20  0320   WBC 8.4 8.0   RBC 5.26* 5.14   INR 1.09  --          Patient Active Problem List    Diagnosis Date Noted    Brain bleed ([facility] 09/17/2020       Assessment: 48year old female with acute onset dysarthria and right hand numbness/weakness with a 9 mm focus of acute intracranial hemorrhage in the left frontal lobe. MRI pending for evaluation of underlying lesion. Plan:        1. No emergent neurosurgical intervention indicated        2. Neurologic exams frequency: q 4 hours  3. For change in exam MUST contact neurosurgery team along with critical care or primary team  4. Follow up head CT stable. MRI brain w wo contrast to evaluate for underling lesion, will give further recs when imaging is completed   5. Maintain SBP <160; If PRN med insufficient, then may start Nicardipine infusion  6. Keep Plt >100k & INR <1.4  7. Hold all anticoagulation & antiplatelet for 2 weeks  8. SCDs for DVT prophylaxis  9. Cerebral edema:   - normalize sodium   - Keep HOB >30 degrees  - NO dextrose in IVF's or in IV drips  10. Seizure prophylaxis: Keppra 500 mg BID  11. Advance diet / activity per primary team  12. Thanks for consult. Please call w/ questions or decline in mental status       DISPO-dispo timing up to primary team when patient is medically stable for discharge     [phone]    Patient was discussed with Dr. Desirae Miranda who agrees with above assessment and plan. Electronically signed by:  Dinora Drake, 9/18/2020 12:03 PM

## 2020-09-18 NOTE — CARE COORDINATION
Case Management Daily Note                    Date: 9/18/2020     Patient Name: Rajesh Motley    Date of Admission: 9/17/2020  9:02 PM  YOB: 1970    Length of Stay: 1         Patient Admission Status: Inpatient  Diagnosis:Brain bleed ([address] [I61.9]     ________________________________________________________________________________________  Discharge Plan: Home  Home alone with family support     Insurance: Payor: UMR / Plan: UMR  / Product Type: *No Product type* /   Is pre-cert/notification needed: Yes     Tentative discharge date: 9/20    Current barriers: Medical Clearance     Referrals completed: Not Applicable    Resources/ information provided: Not indicated at this time   ________________________________________________________________________________________  PT AM-PAC: 24 / 24 per last evaluation on: 9/18    OT AM-PAC: 24 / 24 per last evaluation on: 9/18    DME Needs for discharge: None   ________________________________________________________________________________________  Notes/Plan of Care:   Patient remains in COVID-19 precautions precluding routine rounding/contact. All efforts made to respect recommendations of social distancing and decrease PPE consumption. SW learned patient is from home independent. Very active going to the gym. Therapy cleared for home. No needs noted. Maximo Brunner and/or her family were provided with choice of provider; she and/or her family are in agreement with the discharge plan at this time.     Care Transition Patient: INNA Florian  [facility]   Case Management Department  Ph: [phone]

## 2020-09-18 NOTE — PROGRESS NOTES
4 Eyes Admission Assessment     I agree as the admission nurse that 2 RN's have performed a thorough Head to Toe Skin Assessment on the patient. ALL assessment sites listed below have been assessed on admission. Areas assessed by both nurses: yes  [x]   Head, Face, and Ears   [x]   Shoulders, Back, and Chest  [x]   Arms, Elbows, and Hands   [x]   Coccyx, Sacrum, and Ischium  [x]   Legs, Feet, and Heels        Does the Patient have Skin Breakdown?   No         Pravin Prevention initiated:  Yes   Wound Care Orders initiated:  No      [facility] nurse consulted for Pressure Injury (Stage 3,4, Unstageable, DTI, NWPT, and Complex wounds) or Pravin score 18 or lower:  No      Nurse 1 eSignature: Electronically signed by Leonard Salvador RN on 9/17/20 at 8:29 PM EDT    **SHARE this note so that the co-signing nurse is able to place an eSignature**    Nurse 2 eSignature: Electronically signed by Vazquez Hubbard RN on 9/17/20 at 9:38 PM EDT

## 2020-09-18 NOTE — PROGRESS NOTES
symptoms have fully resolved. Denies concerns for discharge home. Deonna Gely think it may have been my blood pressure. I used to monitor it, but stopped. \"    Patient Currently in Pain: Denies      Social/Functional History  Social/Functional History  Lives With: Alone  Type of Home: Apartment  Home Layout: One level  Home Access: (3 flights of steps to apartment level)  Bathroom Shower/Tub: Walk-in shower  Bathroom Toilet: Standard(leverage beside toilet)  Bathroom Accessibility: Accessible  Home Equipment: (none)  ADL Assistance: Independent  Homemaking Assistance: Independent  Ambulation Assistance: Independent  Transfer Assistance: Independent  Active : Yes  Type of occupation: working from home  [address]: [email or web address]; goes to the gym  IADL Comments: does own grocery shopping  Additional Comments: denies falls       Objective   Vision: Within Functional Limits  Hearing: Within functional limits      Orientation  Overall Orientation Status: Within Normal Limits        Balance  Sitting Balance: Independent  Standing Balance: Independent    Functional Mobility  Functional - Mobility Device: No device  Activity: To/from bathroom; Other(mobility in room for item retrieval)  Assist Level: Independent  Functional Mobility Comments: Note: steady, no LOB    Toilet Transfers  Toilet - Technique: Ambulating  Equipment Used: Standard toilet(w/ bar)  Toilet Transfer: Modified independent    ADL  Grooming: Independent(brushing teeth at sink level, standing)  LE Dressing: Independent(socks)  Toileting: Independent     Tone RUE  RUE Tone: Normotonic  Tone LUE  LUE Tone: Normotonic  Coordination  Movements Are Fluid And Coordinated: Yes  Quality of Movement Other  Comment: using cell phone w/o difficulty; accurately reaching and retrieving items        Bed mobility  Supine to Sit: Independent  Sit to Supine: Independent  Scooting: Independent     Transfers  Sit to stand: Independent  Stand to sit:  Independent Cognition  Overall Cognitive Status: WNL           Sensation  Overall Sensation Status: WFL(denies)        LUE AROM (degrees)  LUE AROM : WNL  Left Hand AROM (degrees)  Left Hand AROM: WNL  RUE AROM (degrees)  RUE AROM : WNL  Right Hand AROM (degrees)  Right Hand AROM: WNL  LUE Strength  Gross LUE Strength: WNL  LUE Strength Comment: 5/5  RUE Strength  Gross RUE Strength: WNL  RUE Strength Comment: 5/5        Hand Dominance  Hand Dominance: Right         Pt seen by OT for eval and treat. Treatment included: bed mobility, functional transfer/mobility, ADL         Plan   Discharge acute OT - no needs. Pt is independent.                                                     AM-PAC Score        AM-PAC Inpatient Daily Activity Raw Score: 24 (09/18/20 1047)  AM-PAC Inpatient ADL T-Scale Score : 57.54 (09/18/20 1047)  ADL Inpatient CMS 0-100% Score: 0 (09/18/20 1047)  ADL Inpatient CMS G-Code Modifier : CH (09/18/20 1047)              Therapy Time   Individual Concurrent Group Co-treatment   Time In 6837         Time Out 1033         Minutes 39           Timed Code Treatment Minutes:   24    Total Treatment Minutes:  10 [facility] OTR/L #[id number]

## 2020-09-18 NOTE — PROGRESS NOTES
Internal Medicine PGY- 1 Resident History & Physical      PCP: Farshad Ibarra    Date of Admission: 9/17/2020    Chief Complaint:  Speech difficulty, numbness and tingling the right hand    History Of Present Illness:      Rajesh Motley is a 48 y.o. female w/ PMHx of DVT and PE (on Xarelto) presented to [facility] with chief complain of speech difficulty, numbness, and tingling of the right hand. Pt mentions that she had mild dysarthria started last night at 12:00 am. She went to bed and when she woke up she still had dysarthria. She also started having weakness and numbness of the fourth and fifth digits of her right hands. She noticed this symptoms when she was working from home and was typing. She did not have a headache, chest pain, nausea/vomiting, palpitation, dizziness, and seizure. She mentions that she have been having some SOB recently. She also had RLE weakness that lasted a few minutes a couple weeks ago. However, since her symptoms resolved within a few minutes, she decided not to seek medical help. She called her PCP and was told to go to ER. She presented to [facility] ED. At [facility] ED, her BP was 179/88, HR 90, RR 20. Emergent CT of the head reveals a9 mm focus of acute intracranial hemorrhage in the left frontal lobe. She received 2 g of Keppra, Kcentra, nicardipine ggt due to a blood pressure for her high blood pressure. Neurosurgery was consulted and patient was transferred to the [facility] in [place]. Past Medical History:    No past medical history on file. Past Surgical History:          Procedure Laterality Date    CHOLECYSTECTOMY         Medications Prior to Admission:      Prior to Admission medications    Medication Sig Start Date End Date Taking?  Authorizing Provider   rivaroxaban (XARELTO) 20 MG TABS tablet TAKE ONE TABLET BY MOUTH DAILY WITH DINNER 2/18/19   Historical Provider, MD   norethindrone-ethinyl estradiol ([address] 53 7/7/7) [place] , BACTERIA, RBCUA, BLOODU, [address], [address]    Radiology:     CXR: I have reviewed the CXR with the following interpretation:   EKG:  I have reviewed the EKG with the following interpretation:     CT head without contrast    (Results Pending)       ASSESSMENT & PLAN:  Parminder Robertson is a 48 y.o. female w/ PMHx of DVT and PE (on Xarelto) presented to [facility] with chief complain of speech difficulty, numbness, and tingling of the right hand. Spontaneous intracranial hemorrhage:   - CT head:  9 mm focus of acute intracranial hemorrhage in the left frontal lobe. - She received 2 g of Keppra, Kcentra, nicardipine ggt due to a blood pressure for her high blood pressure.  - Neuro check q 1 hr  - Nicardipine ggt   - Lipitor 80 mg  - BP < 160  - repeat CT head at 11 PM  - Neurosurgery consult  - Elevate head of the bed  - Hold antiplatelet and anti coaugulation  - PT/OT    Hypertensive emergency: Pt presented with BP above 179/88. CT head:  9 mm focus of acute intracranial hemorrhage in the left frontal lobe.    - Tele monitor  - Nicardipine ggt  - Maintain BP <160    DVTProphylaxis: SCD  Diet: Diet NPO Effective Now  Code Status: Full Code    I will discuss the patient with MD Cindi Turner MD  Internal Medicine Resident, PGY-1  Contact via Skylar Varner

## 2020-09-18 NOTE — ACP (ADVANCE CARE PLANNING)
Advance Care Planning   Advance Care Planning Clinical Specialist  Conversation Note      Date of ACP Conversation: 9/18/2020    Conversation Conducted with:   Patient with Decision Making Capacity       ACP Clinical Specialist: Jero Rod      *When Decision Maker makes decisions on behalf of the incapacitated patient: Artemio Flores is asked to consider and make decisions based on patient values, known preferences, or best interests. Current Designated Health Care Decision Maker:   (as entered in [address]  field. Validate  this information as still accurate & up-to-date; edit [place] field as needed.)    If no Decision Maker listed above or available through scanned documents, then:    [address]   Who do you trust to make healthcare decisions for you? Name:   Glenn Grey, Father  Phone  Number: [phone]  Can this person be reached and be able to respond quickly, such as within a few minutes or hours? yes    Who would be your back-up decision maker? Name Roman Hunt, sister  Phone Number:[phone]    For below questions, when conducting conversation with Angelo, substitute \"she\" and \"her\" for \"you\" and \"your\". Hospitalization  If your health were to worsen and it became clear that your chance of recovery was unlikely, what would your preferences be regarding hospitalization?:    Choice:  [x]  The patient would want hospitalization  []  The patient would prefer comfort-focused treatment without hospitalization. Ventilation  If you were in your present state of health and suddenly became very ill and were unable to breathe on your own, what would your preference be about the use of a ventilator (breathing machine) if it were available to you?       If patient would desire the use of a ventilator (breathing machine), answer \"yes\", if not \"no\":yes    If your health were to worsen and it became clear that your chance of recovery was unlikely, would that change your answer? {Responses; yes/no (default no):No- unless or until father decides to use/  Not use ventilator    Resuscitation  CPR works best to restart the heart when there is a sudden event, like a heart attack, in someone who is otherwise healthy. Unfortunately, CPR does not typically restart the heart for people who have serious health conditions or who are very sick. In the event your heart stopped, would you want attempts to restart your heart (answer \"yes\") or would you prefer a natural death (answer \"no\")? yes    If your health were to worsen and it became clear that your chance of recovery was unlikely, would that change your answer? no    [] Yes  [x] No   Educated Patient / Decision Maker regarding differences between Advance Directives and portable DNR orders. Length of ACP Conversation in minutes:  15 minutes    Conversation Outcomes:  [x] ACP discussion completed  [] Existing advance directive reviewed with patient; no changes to patient's previously recorded wishes   [] New Advance Directive completed   [] Portable Do Not Rescitate prepared for Provider review and signature  [] POLST/POST/MOLST/MOST prepared for Provider review and signature      Follow-up plan:    [] Schedule follow-up conversation to continue planning  [] Referred individual to Provider for additional questions/concerns   [] Advised patient/agent/surrogate to review completed ACP document and update if needed with changes in condition, patient preferences or care setting     [] This note routed to one or more involved healthcare providers   advised patient about next of kin making decisions in absence of adv. Dir. Documents. She declined to sign an AD, but her father is [facility] and her desired decision maker anyway.

## 2020-09-18 NOTE — CONSULTS
ICU History and Physical  PGY-1    PCP: Ellen Ortiz    Code:Full Code  Admit Date: 9/17/2020  Vent Settings:    / / /   IV Access: PIVx2 Central Line: no    IV Fluids: none  Vasopressors: none  Antibiotics: none   Diet: DIET GENERAL;      History of present illness:      CC: Speech difficulty, numbness and tingling the right hand    Patient is a 48 y.o. female with a PMHx of DVT and PE (on Xarelto) presented to [facility] with chief complain of speech difficulty, numbness, and tingling of the right hand. Pt mentions that she had mild dysarthria started last night at 12:00 am. She went to bed and when she woke up she still had dysarthria. She also started having weakness and numbness of the fourth and fifth digits of her right hands. She noticed this symptoms when she was working from home and was typing. She did not have a headache, chest pain, nausea/vomiting, palpitation, dizziness, and seizure. She mentions that she have been having some SOB recently. She also had RLE weakness that lasted a few minutes a couple weeks ago. However, since her symptoms resolved within a few minutes, she decided not to seek medical help. She called her PCP and was told to go to ER. At [facility] ED, her BP was 179/88, HR 90, RR 20. Emergent CT of the head reveals a 9 mm focus of acute intracranial hemorrhage in the left frontal lobe. She received 2 g of Keppra, Kcentra, nicardipine ggt due to a blood pressure for her high blood pressure. Neurosurgery was consulted and patient was transferred to the [facility]. ICU. This morning patient is doing well, he is alert and follows commands appropriately. She has no neurologic deficits, her speech numbness is improved. She has been seen by PT/OT and has been walking the halls. Denies fever, chills, chest pain, shortness of breath, cough, abdominal pain, nausea/vomiting, constipation/diarrhea. ROS: Review of Systems - Negative except as above.    All other systems reviewed and are negative. Past Medical / Surgical History:    No past medical history on file. Past Surgical History:   Procedure Laterality Date    CHOLECYSTECTOMY         Medications Prior to Admission:    No current facility-administered medications on file prior to encounter. Current Outpatient Medications on File Prior to Encounter   Medication Sig Dispense Refill    rivaroxaban (XARELTO) 20 MG TABS tablet TAKE ONE TABLET BY MOUTH DAILY WITH DINNER      norethindrone-ethinyl estradiol (Pilekrogen 53 7/7/7) 0.5/0.75/1-35 MG-MCG per tablet Take 1 tablet by mouth daily. Allergies:  Patient has no known allergies. Social History:   TOBACCO:   reports that she has never smoked. She has never used smokeless tobacco.       ETOH:   reports current alcohol use. Patient currently lives at home    Family History:   No family history on file. Vital/I&O/Physical examination:   VS:  /82   Pulse 72   Temp 99.1 °F (37.3 °C) (Oral)   Resp 17   Ht 5' 5\" (1.651 m)   Wt 227 lb 15.3 oz (103.4 kg)   SpO2 98%   BMI 37.93 kg/m²     I/O:    Intake/Output Summary (Last 24 hours) at 9/18/2020 1445  Last data filed at 9/18/2020 0600  Gross per 24 hour   Intake 444 ml   Output 1100 ml   Net -656 ml       PE:  Physical Exam  Constitutional:       Appearance: Normal appearance. HENT:      Head: Normocephalic and atraumatic. Mouth/Throat:      Mouth: Mucous membranes are moist.   Eyes:      Extraocular Movements: Extraocular movements intact. Pupils: Pupils are equal, round, and reactive to light. Cardiovascular:      Rate and Rhythm: Normal rate and regular rhythm. Pulses: Normal pulses. Heart sounds: Normal heart sounds. Pulmonary:      Effort: Pulmonary effort is normal.      Breath sounds: Normal breath sounds. Abdominal:      General: Abdomen is flat. Bowel sounds are normal. There is no distension. Palpations: Abdomen is soft. Tenderness:  There is no presented to [facility] with chief complain of speech difficulty, numbness, and tingling of the right hand.      Spontaneous intracranial hemorrhage:   CT head:  9 mm focus of acute intracranial hemorrhage in the left frontal lobe.   - s/p 2 g of Keppra, Kcentra, nicardipine ggt due to high blood pressure  - Neuro check q 4 hr  -Weaned off the nicardipine drip, blood pressures normotensive   - SBP <160  -Continue Lipitor 80 mg  - repeat CT head at 11 PM 9/17 is stable, 8 mm bleed  - Neurosurgery consult, no surgical intervention  - Elevate head of the bed  - Hold antiplatelet and anti coaugulation  - PT/OT evaluated: 24/24  - MRI brain w wo contrast to evaluate for underling lesion, neurosurgery following    Hypertensive emergency: Pt presented with BP above 179/88. CT head:  9 mm focus of acute intracranial hemorrhage in the left frontal lobe.   - Tele monitor  -Successfully weaned off nicardipine drip, blood pressures are now normotensive  - Maintain SBP <160    Code Status: Full Code  FEN:  DIET GENERAL;  PPX: SCDs  DISPO: Transfer to       This patient will be discussed with attending, Dr Tracie Contreras MD, PGY- 1  Contact via [employer]  9/18/2020,  2:45 PM     Pulm/CC    Patient seen and examined. I agree with Dr. Ursula Saldivar history, physical, lab findings, assessment and plan. Patient with ICH associated with hypertensive emergency. Symptoms of expressive aphasia and right hand numbness have resolved. She is off her nicardipine drip but systolic blood pressure is approximately 150. She states that she has had elevated readings in the past and likely should be on blood pressure medicine. We will start Toprol XL 25 mg daily and titrate from there. Okay to transfer to [address] [place] [address].   Will sign off    Ivanna Garcia MD

## 2020-09-18 NOTE — CONSULTS
have been marked as taking for the 9/17/20 encounter [facility] Encounter). Current Facility-Administered Medications   Medication Dose Route Frequency Provider Last Rate Last Dose    levETIRAcetam (KEPPRA) tablet 500 mg  500 mg Oral BID Aneudy Gallardo MD   500 mg at 09/18/20 1346    metoprolol succinate (TOPROL XL) extended release tablet 25 mg  25 mg Oral Daily Neto Brady MD   25 mg at 09/18/20 1346    sodium chloride flush 0.9 % injection 10 mL  10 mL Intravenous 2 times per day Siomara Bean MD   10 mL at 09/18/20 1002    sodium chloride flush 0.9 % injection 10 mL  10 mL Intravenous PRN Siomara Bean MD        acetaminophen (TYLENOL) tablet 650 mg  650 mg Oral Q4H PRN Siomara Bean MD   650 mg at 09/17/20 2228    promethazine (PHENERGAN) tablet 12.5 mg  12.5 mg Oral Q6H PRN Siomara Bean MD        Or    ondansetron (ZOFRAN) injection 4 mg  4 mg Intravenous Q6H PRN Siomara Bean MD        atorvastatin (LIPITOR) tablet 80 mg  80 mg Oral Nightly Siomara Bean MD   80 mg at 09/17/20 2228    oxyCODONE-acetaminophen (PERCOCET) 5-325 MG per tablet 1 tablet  1 tablet Oral Q6H PRN Siomara Bean MD   1 tablet at 09/18/20 0058      Objective:  /82   Pulse 72   Temp 99.1 °F (37.3 °C) (Oral)   Resp 17   Ht 5' 5\" (1.651 m)   Wt 227 lb 15.3 oz (103.4 kg)   SpO2 98%   BMI 37.93 kg/m²     Physical Exam:  Patient was seen during Covid-19 pandemic and all efforts made to respect recommendations of social distancing and decrease PPE have been made. Unable to perform face to face examination of the patient. Exam taken from nurse. Radiological Findings:  I personally reviewed the patient's imaging which consists of a CT scan dated 9/17/2020. This reveals a small hyperdense lesion within the left frontal lobe perhaps consistent with SAH. This is stable on repeat imaging.         Labs  Recent Labs     09/17/20  1729 09/18/20  0320    137    105   CO2 25 22   BUN 13 9   CREATININE 0.8 0.6   GLUCOSE 138* 110* Recent Labs     09/17/20  1729 09/18/20  0320   WBC 8.4 8.0   RBC 5.26* 5.14   INR 1.09  --          Patient Active Problem List    Diagnosis Date Noted    Brain bleed ([facility] [address] 09/17/2020       Assessment: 48year old female with acute onset dysarthria and right hand numbness/weakness with a 9 mm focus of acute intracranial hemorrhage in the left frontal lobe. MRI pending for evaluation of underlying lesion. Plan:        1. No emergent neurosurgical intervention indicated        2. Neurologic exams frequency: q 4 hours  3. Follow up head CT stable. MRI brain w wo contrast to evaluate for underlying lesion  4. Maintain SBP <160; If PRN med insufficient, then may start Nicardipine infusion  5. Keep Plt >100k & INR <1.4  6. Hold all anticoagulation & antiplatelet for 2 weeks  7. SCDs for DVT prophylaxis  8. Cerebral edema:   - normalize sodium   - Keep HOB >30 degrees  - Seizure prophylaxis: Keppra 500 mg BID    Thanks for consult.  Please call w/ questions or decline in mental status     Tian Salazar MD, PhD  [address], [address] [place] [address], [id number] [phone] (c), [phone] (o)

## 2020-09-19 LAB
ANION GAP SERPL CALCULATED.3IONS-SCNC: 11 MMOL/L (ref 3–16)
BUN BLDV-MCNC: 12 MG/DL (ref 7–20)
CALCIUM SERPL-MCNC: 9.1 MG/DL (ref 8.3–10.6)
CHLORIDE BLD-SCNC: 105 MMOL/L (ref 99–110)
CO2: 21 MMOL/L (ref 21–32)
CREAT SERPL-MCNC: 0.8 MG/DL (ref 0.6–1.1)
GFR AFRICAN AMERICAN: >60
GFR NON-AFRICAN AMERICAN: >60
GLUCOSE BLD-MCNC: 90 MG/DL (ref 70–99)
HCT VFR BLD CALC: 44.3 % (ref 36–48)
HEMOGLOBIN: 14.3 G/DL (ref 12–16)
MCH RBC QN AUTO: 27.5 PG (ref 26–34)
MCHC RBC AUTO-ENTMCNC: 32.2 G/DL (ref 31–36)
MCV RBC AUTO: 85.4 FL (ref 80–100)
PDW BLD-RTO: 14.4 % (ref 12.4–15.4)
PLATELET # BLD: 167 K/UL (ref 135–450)
PMV BLD AUTO: 9.2 FL (ref 5–10.5)
POTASSIUM SERPL-SCNC: 4.2 MMOL/L (ref 3.5–5.1)
RBC # BLD: 5.19 M/UL (ref 4–5.2)
SODIUM BLD-SCNC: 137 MMOL/L (ref 136–145)
WBC # BLD: 7.1 K/UL (ref 4–11)

## 2020-09-19 PROCEDURE — 1200000000 HC SEMI PRIVATE

## 2020-09-19 PROCEDURE — 2580000003 HC RX 258: Performed by: STUDENT IN AN ORGANIZED HEALTH CARE EDUCATION/TRAINING PROGRAM

## 2020-09-19 PROCEDURE — 6370000000 HC RX 637 (ALT 250 FOR IP): Performed by: STUDENT IN AN ORGANIZED HEALTH CARE EDUCATION/TRAINING PROGRAM

## 2020-09-19 PROCEDURE — 36415 COLL VENOUS BLD VENIPUNCTURE: CPT

## 2020-09-19 PROCEDURE — 80048 BASIC METABOLIC PNL TOTAL CA: CPT

## 2020-09-19 PROCEDURE — 6370000000 HC RX 637 (ALT 250 FOR IP): Performed by: INTERNAL MEDICINE

## 2020-09-19 PROCEDURE — 85027 COMPLETE CBC AUTOMATED: CPT

## 2020-09-19 RX ORDER — ATORVASTATIN CALCIUM 80 MG/1
80 TABLET, FILM COATED ORAL NIGHTLY
Qty: 30 TABLET | Refills: 3 | Status: SHIPPED | OUTPATIENT
Start: 2020-09-19

## 2020-09-19 RX ORDER — METOPROLOL SUCCINATE 25 MG/1
25 TABLET, EXTENDED RELEASE ORAL DAILY
Qty: 30 TABLET | Refills: 3 | Status: SHIPPED | OUTPATIENT
Start: 2020-09-20

## 2020-09-19 RX ORDER — LEVETIRACETAM 500 MG/1
500 TABLET ORAL 2 TIMES DAILY
Qty: 60 TABLET | Refills: 3 | Status: SHIPPED | OUTPATIENT
Start: 2020-09-19 | End: 2021-01-14 | Stop reason: HOSPADM

## 2020-09-19 RX ORDER — METOPROLOL SUCCINATE 25 MG/1
25 TABLET, EXTENDED RELEASE ORAL DAILY
Qty: 30 TABLET | Refills: 3 | Status: SHIPPED | OUTPATIENT
Start: 2020-09-20 | End: 2020-09-19

## 2020-09-19 RX ORDER — LEVETIRACETAM 500 MG/1
500 TABLET ORAL 2 TIMES DAILY
Qty: 60 TABLET | Refills: 3 | Status: SHIPPED | OUTPATIENT
Start: 2020-09-19 | End: 2020-09-19

## 2020-09-19 RX ORDER — ATORVASTATIN CALCIUM 80 MG/1
80 TABLET, FILM COATED ORAL NIGHTLY
Qty: 30 TABLET | Refills: 3 | Status: SHIPPED | OUTPATIENT
Start: 2020-09-19 | End: 2020-09-19

## 2020-09-19 RX ADMIN — Medication 10 ML: at 21:59

## 2020-09-19 RX ADMIN — ATORVASTATIN CALCIUM 80 MG: 80 TABLET, FILM COATED ORAL at 20:35

## 2020-09-19 RX ADMIN — LEVETIRACETAM 500 MG: 500 TABLET, FILM COATED ORAL at 08:47

## 2020-09-19 RX ADMIN — LEVETIRACETAM 500 MG: 500 TABLET, FILM COATED ORAL at 20:35

## 2020-09-19 RX ADMIN — METOPROLOL SUCCINATE 25 MG: 50 TABLET, EXTENDED RELEASE ORAL at 08:47

## 2020-09-19 ASSESSMENT — PAIN SCALES - GENERAL
PAINLEVEL_OUTOF10: 0

## 2020-09-19 NOTE — PROGRESS NOTES
Pt w questions regarding upcoming surgery, wishes to discuss further with neurosurgery. PT would like to minimize the total time she is off of home Xarelto and have surgery performed in the next few days if feasible. Dr Villanueva left message this AM via call through Skylar Varner. Dr Lan Rivera aware of above, requests discuss with neurosurgery prior to discharging patient home.

## 2020-09-19 NOTE — CONSULTS
[address], [address]                                  CONSULTATION    PATIENT NAME: Deion November                       :        1970  MED REC NO:   [id number]                          ROOM:       4516  ACCOUNT NO:   [de-identified]                           ADMIT DATE: 2020  PROVIDER:     Mynor Mondragon MD    CONSULT DATE:  2020    REFERRING PROVIDER:  Dr. Ruddy Louis    PCP:  Dr. Leslie Gray:  The patient is a 63-year-old woman with  past medical history significant for DVT and pulmonary emboli dating  back to approximately . 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 She has been on  anticoagulation with Xarelto since then with no significant morbidity  from that. Over the last few days, the patient started to report some  numbness and tingling involving her right hand. This later turned into  dysarthria. She contacted her primary care physician and was instructed  to go to the emergency department for further evaluation. She was seen  at [facility] through the emergency department, where her systolic  BP was noted to be in the 180s range. A CT scan of the head done at  that same time revealed a 9 mm focus of acute intracranial hemorrhage  involving the left frontal lobe. She received Keppra and was started on  IV nicardipine for BP management. She was then referred/transferred to  [facility]. for further evaluation. With these findings, I was  asked to consult on the patient and make recommendations regarding her  history of DVT/PE, underlying thrombophilia and ongoing anticoagulation. PAST MEDICAL HISTORY:  None. PAST SURGICAL HISTORY:  Cholecystectomy. SOCIAL HISTORY:  She lives alone. She denies tobacco use. She denies  alcohol abuse.   She reports occasional marijuana use approximately once  per month. REVIEW OF SYSTEMS:  She reports mild numbness and tingling involving her  right upper extremity though improved. Her dysarthria has improved over  the last 24 hours. She denies headache, blurry vision, double vision. She has no other constitutional symptoms. The remainder of her 10-point  review of systems is negative. ALLERGIES:  No known drug allergies. MEDICATIONS:  Reviewed in Epic. PHYSICAL EXAMINATION:  GENERAL:  She is a middle-aged woman, alert and oriented, in no acute  distress. VITAL SIGNS:  Now within normal limits. HEENT:  Pupils are equal and reactive. Oral mucosa is intact. RESPIRATORY:  Clear to auscultation bilaterally. CARDIOVASCULAR:  Regular rate and rhythm. No rubs or murmurs. ABDOMEN:  Nontender. EXTREMITIES:  No edema. SKIN:  No rash or dermatitis. LABORATORY DATA:  Reviewed. ASSESSMENT AND PLAN:  A 55-year-old woman with new left frontal lobe  intracranial hemorrhage. Unfortunately, we will need to hold all  anticoagulation until the recent bleed stabilizes. We will defer to  Neurosurgery for that recommendation. I did recommend restarting Keppra  once her CNS bleed stabilizes, but without a loading dose. The above was discussed with the patient and multiple questions and  scenarios were addressed. She will require no further workup or therapy  from a hematologic standpoint.         Mirlande Blum MD    D: 09/18/2020 18:33:16       T: 09/18/2020 19:38:54     MONIK_NÉSTOR  Job#: [id number]     Doc#: [id number]    CC:

## 2020-09-19 NOTE — PLAN OF CARE
Problem: HEMODYNAMIC STATUS  Goal: Patient has stable vital signs and fluid balance  Outcome: Ongoing  Note: Pt vital signs are stable. Taking Toprol XL, denies s/s of orthostatic hypotension     Problem: ACTIVITY INTOLERANCE/IMPAIRED MOBILITY  Goal: Mobility/activity is maintained at optimum level for patient  Outcome: Ongoing  Note: Pt ambulates around room ad claude. Pt is at baseline for mobility. Problem: COMMUNICATION IMPAIRMENT  Goal: Ability to express needs and understand communication  Outcome: Ongoing  Note: Pt communication ability is at baseline. Problem: Pain:  Goal: Pain level will decrease  Description: Pain level will decrease  Outcome: Ongoing  Goal: Recognizes and communicates pain  Description: Recognizes and communicates pain  Outcome: Ongoing  Note: Pt reeducated on communicating pain such as headaches while in the hospital. Pt agrees to plan. Problem: Urinary Elimination - Impaired:  Goal: Urinary elimination within specified parameters  Description: Urinary elimination within specified parameters  Outcome: Ongoing  Note: Pt at baseline for elimination, no retention. Problem: Infection:  Goal: Will remain free from infection  Description: Will remain free from infection  Outcome: Ongoing     Problem: Safety:  Goal: Free from accidental physical injury  Description: Free from accidental physical injury  Outcome: Ongoing  Goal: Free from intentional harm  Description: Free from intentional harm  Outcome: Ongoing     Problem: Falls - Risk of:  Goal: Will remain free from falls  Description: Will remain free from falls  Outcome: Ongoing  Note: Pt is a fall risk. Fall risk protocol in place. See Loagn Ramsayi Fall Score. Pt bed is in low position, bed alarm is on, side rails up, fall risk bracelet applied, non-skid footwear in use. Patient/family educated on fall risk protocol, instructed to call for assistance when needed and belongings are in reach.  Will continue with hourly rounds for po intake, pain needs, toileting and repositioning as needed. Will continue to monitor for needs.     Goal: Absence of physical injury  Description: Absence of physical injury  Outcome: Ongoing     Problem: Falls - Risk of:  Goal: Absence of physical injury  Description: Absence of physical injury  Outcome: Ongoing

## 2020-09-19 NOTE — PROGRESS NOTES
Speech Language Pathology    Chart reviewed and d/w RN. Attempted to completed BSE and speech / language / cognitive evaluation but RN about to t/f pt off ICU. RN reported no concerns for dysphagia / aspiration at this time and denied any overt cognitive deficits. As pt with plans for resection of cavernoma in a few days, recommend completing SLP evaluations to obtain pre-op status and baseline functioning prior to surgery. Will re-attempt at another time as pt availability and schedule permits.     Serenity Castillo MA CCC-SLP; [id number]  Speech-Language Pathologist  Pager # [phone]  Phone # [phone]  Office # [phone]

## 2020-09-19 NOTE — PROGRESS NOTES
Pt admitted to [facility]10. Pt A/O x4, sba. Pt denies any chest pain, SOB or nausea/vomitting at this time. Pt NIH is 0. Pt educated to new room and how to use call light. Fall precautions in place.  Will continue to monitor

## 2020-09-19 NOTE — PROGRESS NOTES
Progress Note        Date:2020       Room:4516/4516-01  Patient Name:Merry Kim     YOB: 1970     Age:50 y.o. Dysarthria, numbness          Subjective   Interval History Status: improved. Denies any HA, nausea, vomiting  Numbness improved. Speech improved. Review of Systems   ROS as mentioned above. Medications   Scheduled Meds:    levETIRAcetam  500 mg Oral BID    metoprolol succinate  25 mg Oral Daily    sodium chloride flush  10 mL Intravenous 2 times per day    atorvastatin  80 mg Oral Nightly     Continuous Infusions:     PRN Meds: sodium chloride flush, acetaminophen, promethazine **OR** ondansetron, oxyCODONE-acetaminophen    Past History    Past Medical History:   has a past medical history of Pulmonary embolism ([address] Social History:   reports that she has never smoked. She has never used smokeless tobacco. She reports current alcohol use. She reports current drug use. Drug: Marijuana. Family History: No family history on file. Physical Examination      Vitals:  /87   Pulse 73   Temp 98.2 °F (36.8 °C) (Axillary)   Resp 18   Ht 5' 5\" (1.651 m)   Wt 227 lb 15.3 oz (103.4 kg)   SpO2 98%   BMI 37.93 kg/m²   Temp (24hrs), Av.4 °F (36.9 °C), Min:98.2 °F (36.8 °C), Max:98.5 °F (36.9 °C)      I/O (24Hr): Intake/Output Summary (Last 24 hours) at 2020 1431  Last data filed at 2020 0900  Gross per 24 hour   Intake 120 ml   Output 0 ml   Net 120 ml       Physical Exam  Constitutional:       Appearance: Normal appearance. Neck:      Musculoskeletal: Normal range of motion and neck supple. Cardiovascular:      Rate and Rhythm: Normal rate and regular rhythm. Pulses: Normal pulses. Heart sounds: Normal heart sounds. Pulmonary:      Effort: Pulmonary effort is normal.      Breath sounds: Normal breath sounds. Abdominal:      General: Abdomen is flat. Palpations: Abdomen is soft.    Musculoskeletal: Normal range of motion. General: No swelling or tenderness. Skin:     General: Skin is warm and dry. Capillary Refill: Capillary refill takes less than 2 seconds. Neurological:      General: No focal deficit present. Mental Status: She is alert and oriented to person, place, and time. Psychiatric:         Mood and Affect: Mood normal.         Behavior: Behavior normal.           Labs/Imaging/Diagnostics   Labs:  CBC:   Recent Labs     09/17/20 1729 09/18/20  0320 09/19/20  0508   WBC 8.4 8.0 7.1   HGB 14.4 14.2 14.3   HCT 44.6 43.2 44.3   MCV 84.8 83.9 85.4    175 167     BMP:   Recent Labs     09/17/20  1729 09/18/20  0320 09/19/20  0508    137 137   K 3.8 3.6 4.2    105 105   CO2 25 22 21   BUN 13 9 12   CREATININE 0.8 0.6 0.8     Mag: No results found for: MG  LIVER PROFILE: No results for input(s): AST, ALT, LIPASE, BILIDIR, BILITOT, ALKPHOS in the last 72 hours. Invalid input(s): AMYLASE,  ALB  PT/INR:   Recent Labs     09/17/20  1729   PROTIME 12.6   INR 1.09     APTT: No results for input(s): APTT in the last 72 hours. BNP:  No results for input(s): BNP in the last 72 hours. CARDIAC ENZYMES:   Recent Labs     09/17/20 1729   TROPONINI <0.01         Imaging Last 24 Hours:  MRI BRAIN W WO CONTRAST   Final Result      1. Small left frontal lobe cavernous malformation, mildly hemorrhagic. CT head without contrast   Final Result       No significant interval change in an 8 mm ill-defined hyperdense lesion    within the left frontal lobe, at the gray matter white matter junction. Differential diagnosis includes intraparenchymal hemorrhagic contusion    secondary to trauma, hemorrhagic metastasis, or cavernous malformation. Assessment      Active Problems:    Brain bleed ([address]  Resolved Problems:    * No resolved hospital problems.  *       Plan:        ICH  Was on xarelto   Xarelto on hold  NS consulted, will need surgery for cavernoma,  if she needs to be on anticoagulation. Discussed neurosurgery, planning for surgery next week  No focal deficits noted. S/p Kcentra. Keppra per NS. Monitor BP off Nicardipine gtt     Hx of thrombophilia  Has seen  Hematology in past.  ?protein s def  Consult Hematology for anticoagulation recs moving forward.   Appreciate recommendations    Disposition:- inpatient    Barbara Baron

## 2020-09-19 NOTE — PROGRESS NOTES
Neurosurgery Progress Note      LOS: 2 days     S:  No acute events overnight. O:   Vitals:    09/18/20 2100 09/18/20 2200 09/19/20 0000 09/19/20 0400   BP:  121/77 121/83 109/69   Pulse: 77 61 64 65   Resp:   18 16   Temp:   98.5 °F (36.9 °C) 98.5 °F (36.9 °C)   TempSrc:   Oral Oral   SpO2:   96% 96%   Weight:       Height:                Intake/Output Summary (Last 24 hours) at 9/19/2020 0710  Last data filed at 9/19/2020 0400  Gross per 24 hour   Intake 0 ml   Output 0 ml   Net 0 ml        Recent Results (from the past 24 hour(s))   Basic metabolic panel    Collection Time: 09/19/20  5:08 AM   Result Value Ref Range    Sodium 137 136 - 145 mmol/L    Potassium 4.2 3.5 - 5.1 mmol/L    Chloride 105 99 - 110 mmol/L    CO2 21 21 - 32 mmol/L    Anion Gap 11 3 - 16    Glucose 90 70 - 99 mg/dL    BUN 12 7 - 20 mg/dL    CREATININE 0.8 0.6 - 1.1 mg/dL    GFR Non-African American >60 >60    GFR African American >60 >60    Calcium 9.1 8.3 - 10.6 mg/dL   CBC    Collection Time: 09/19/20  5:08 AM   Result Value Ref Range    WBC 7.1 4.0 - 11.0 K/uL    RBC 5.19 4.00 - 5.20 M/uL    Hemoglobin 14.3 12.0 - 16.0 g/dL    Hematocrit 44.3 36.0 - 48.0 %    MCV 85.4 80.0 - 100.0 fL    MCH 27.5 26.0 - 34.0 pg    MCHC 32.2 31.0 - 36.0 g/dL    RDW 14.4 12.4 - 15.4 %    Platelets 562 082 - 590 K/uL    MPV 9.2 5.0 - 10.5 fL       Gen: NAD   Pulm: Easy WOB, symmetric chest rise   CV: RRR   Abd: S/NT/ND   Neuro:      GCS: E4 V5 M6 (15)  A&O x4, speech clear and appropriate  Visual Fields full, EOMI  Facial Symmetry normal  Motor 5/5 x4 ext, no drift     Drain output: no drain     Recent Imaging:   MRI BRAIN W WO CONTRAST   Final Result      1. Small left frontal lobe cavernous malformation, mildly hemorrhagic. CT head without contrast   Final Result       No significant interval change in an 8 mm ill-defined hyperdense lesion    within the left frontal lobe, at the gray matter white matter junction.      Differential diagnosis includes intraparenchymal hemorrhagic contusion    secondary to trauma, hemorrhagic metastasis, or cavernous malformation.                A/P: Reza Morrison is a 48 y.o. female who is hospital day 2 for a left frontal hemorrhage, which now appears to be from a cavernoma   -Frequent neurochecks   -Pain control   -will need to consider resection of cavernoma given her need for anticoagulation (Prot C deficiency)   -do not resume anticoagulation now   -she will consider her options but is not prepared to decide now   -may f/u with Neurosurgery in 1-2 weeks, OK to discharge   -Regular diet, bowel regimen   -PT/OT, Activity as tolerated   -DVT ppx with subq heparin, SCDs     Dispo: ICU

## 2020-09-19 NOTE — PROGRESS NOTES
Pt COVID test came back negative, MD notified and asked if Pt could be taken out of Droplet Plus precautions.  Waiting on response

## 2020-09-20 ENCOUNTER — ANESTHESIA EVENT (OUTPATIENT)
Dept: OPERATING ROOM | Age: 50
DRG: 023 | End: 2020-09-20
Payer: COMMERCIAL

## 2020-09-20 LAB
ANION GAP SERPL CALCULATED.3IONS-SCNC: 12 MMOL/L (ref 3–16)
BUN BLDV-MCNC: 14 MG/DL (ref 7–20)
CALCIUM SERPL-MCNC: 8.9 MG/DL (ref 8.3–10.6)
CHLORIDE BLD-SCNC: 104 MMOL/L (ref 99–110)
CO2: 21 MMOL/L (ref 21–32)
CREAT SERPL-MCNC: 0.8 MG/DL (ref 0.6–1.1)
GFR AFRICAN AMERICAN: >60
GFR NON-AFRICAN AMERICAN: >60
GLUCOSE BLD-MCNC: 99 MG/DL (ref 70–99)
HCT VFR BLD CALC: 44.6 % (ref 36–48)
HEMOGLOBIN: 14.6 G/DL (ref 12–16)
MCH RBC QN AUTO: 27.6 PG (ref 26–34)
MCHC RBC AUTO-ENTMCNC: 32.7 G/DL (ref 31–36)
MCV RBC AUTO: 84.5 FL (ref 80–100)
PDW BLD-RTO: 14.6 % (ref 12.4–15.4)
PLATELET # BLD: 156 K/UL (ref 135–450)
PMV BLD AUTO: 9 FL (ref 5–10.5)
POTASSIUM SERPL-SCNC: 4.1 MMOL/L (ref 3.5–5.1)
RBC # BLD: 5.28 M/UL (ref 4–5.2)
SODIUM BLD-SCNC: 137 MMOL/L (ref 136–145)
WBC # BLD: 8.3 K/UL (ref 4–11)

## 2020-09-20 PROCEDURE — 6370000000 HC RX 637 (ALT 250 FOR IP): Performed by: STUDENT IN AN ORGANIZED HEALTH CARE EDUCATION/TRAINING PROGRAM

## 2020-09-20 PROCEDURE — 6370000000 HC RX 637 (ALT 250 FOR IP): Performed by: INTERNAL MEDICINE

## 2020-09-20 PROCEDURE — 80048 BASIC METABOLIC PNL TOTAL CA: CPT

## 2020-09-20 PROCEDURE — 92610 EVALUATE SWALLOWING FUNCTION: CPT | Performed by: SPEECH-LANGUAGE PATHOLOGIST

## 2020-09-20 PROCEDURE — 85027 COMPLETE CBC AUTOMATED: CPT

## 2020-09-20 PROCEDURE — 92523 SPEECH SOUND LANG COMPREHEN: CPT | Performed by: SPEECH-LANGUAGE PATHOLOGIST

## 2020-09-20 PROCEDURE — 2580000003 HC RX 258: Performed by: STUDENT IN AN ORGANIZED HEALTH CARE EDUCATION/TRAINING PROGRAM

## 2020-09-20 PROCEDURE — 1200000000 HC SEMI PRIVATE

## 2020-09-20 PROCEDURE — 36415 COLL VENOUS BLD VENIPUNCTURE: CPT

## 2020-09-20 RX ADMIN — Medication 10 ML: at 21:04

## 2020-09-20 RX ADMIN — LEVETIRACETAM 500 MG: 500 TABLET, FILM COATED ORAL at 21:03

## 2020-09-20 RX ADMIN — ACETAMINOPHEN 650 MG: 325 TABLET ORAL at 09:34

## 2020-09-20 RX ADMIN — ATORVASTATIN CALCIUM 80 MG: 80 TABLET, FILM COATED ORAL at 21:03

## 2020-09-20 RX ADMIN — LEVETIRACETAM 500 MG: 500 TABLET, FILM COATED ORAL at 08:29

## 2020-09-20 RX ADMIN — METOPROLOL SUCCINATE 25 MG: 50 TABLET, EXTENDED RELEASE ORAL at 08:29

## 2020-09-20 ASSESSMENT — PAIN SCALES - GENERAL: PAINLEVEL_OUTOF10: 3

## 2020-09-20 NOTE — PROGRESS NOTES
Pt is alert and oriented times 4. VSS. NIHSS is a 0. Pt denies pain. Pt ambulating well with a SBA to bathroom. All fall precautions in place.  Will continue to Monitor

## 2020-09-20 NOTE — PROGRESS NOTES
Neurosurgery Progress Note      LOS: 3 days     S:  No acute events overnight. Ms. Lucille Palomo has requested that we proceed with surgery as I've recommended. O:   Vitals:    09/19/20 2300 09/20/20 0345 09/20/20 0640 09/20/20 1122   BP: 120/79 109/72 130/75 113/81   Pulse: 73 77 67 54   Resp: 16 16 16 16   Temp: 98.4 °F (36.9 °C) 98.3 °F (36.8 °C) 97.6 °F (36.4 °C) 98.2 °F (36.8 °C)   TempSrc: Oral Oral Oral Oral   SpO2: 96% 96% 96% 93%   Weight:       Height:                Intake/Output Summary (Last 24 hours) at 9/20/2020 1452  Last data filed at 9/20/2020 0145  Gross per 24 hour   Intake 320 ml   Output --   Net 320 ml          Recent Results (from the past 24 hour(s))   Basic metabolic panel    Collection Time: 09/20/20  5:32 AM   Result Value Ref Range    Sodium 137 136 - 145 mmol/L    Potassium 4.1 3.5 - 5.1 mmol/L    Chloride 104 99 - 110 mmol/L    CO2 21 21 - 32 mmol/L    Anion Gap 12 3 - 16    Glucose 99 70 - 99 mg/dL    BUN 14 7 - 20 mg/dL    CREATININE 0.8 0.6 - 1.1 mg/dL    GFR Non-African American >60 >60    GFR African American >60 >60    Calcium 8.9 8.3 - 10.6 mg/dL   CBC    Collection Time: 09/20/20  5:32 AM   Result Value Ref Range    WBC 8.3 4.0 - 11.0 K/uL    RBC 5.28 (H) 4.00 - 5.20 M/uL    Hemoglobin 14.6 12.0 - 16.0 g/dL    Hematocrit 44.6 36.0 - 48.0 %    MCV 84.5 80.0 - 100.0 fL    MCH 27.6 26.0 - 34.0 pg    MCHC 32.7 31.0 - 36.0 g/dL    RDW 14.6 12.4 - 15.4 %    Platelets 397 243 - 387 K/uL    MPV 9.0 5.0 - 10.5 fL       Gen: NAD   Pulm: Easy WOB, symmetric chest rise   CV: RRR   Abd: S/NT/ND   Neuro:      GCS: E4 V5 M6 (15)  A&O x4, speech clear and appropriate  Visual Fields full, EOMI  Facial Symmetry normal  Motor 5/5 x4 ext, no drift     Drain output: no drain     Recent Imaging:   MRI BRAIN W WO CONTRAST   Final Result      1. Small left frontal lobe cavernous malformation, mildly hemorrhagic.          CT head without contrast   Final Result       No significant interval change in an 8 mm ill-defined hyperdense lesion    within the left frontal lobe, at the gray matter white matter junction. Differential diagnosis includes intraparenchymal hemorrhagic contusion    secondary to trauma, hemorrhagic metastasis, or cavernous malformation.           MRI BRAIN WO CONTRAST    (Results Pending)        A/P: Luisa Cabrera is a 48 y.o. female who is hospital day 3 for a left frontal hemorrhage, which now appears to be from a cavernoma   -Frequent neurochecks   -Pain control   -plan for resection of cavernoma given her need for anticoagulation (Prot C deficiency)   -scheduled for OR tomorrow   -f/u with me ([phone]) in 1-2 weeks for wound check  -Regular diet, bowel regimen   -PT/OT, Activity as tolerated   -hold DVT ppx heparin, SCDs     Dispo: floor

## 2020-09-20 NOTE — PROGRESS NOTES
Speech Language Pathology  Facility/Department: [facility] 5T ORTHO/NEURO   CLINICAL BEDSIDE SWALLOW EVALUATION    NAME: Amber Flores  : 1970  MRN: [id number]    ADMISSION DATE: 2020  ADMITTING DIAGNOSIS: has Brain bleed ([address] on their problem list.  ONSET DATE: 20    Recent Chest Xray/CT of Chest: (20)  Impression    Motion limited.  No central pulmonary embolus identified.  No pneumonia or    edema.  No pneumothorax. Date of Eval: 2020  Evaluating Therapist: Gayatri Villagomez    Current Diet level:  Current Diet : Regular  Current Liquid Diet : Thin      Primary Complaint  Patient Complaint: Anxious regarding surgery    Pain:  Pain Assessment  Pain Assessment: 0-10  Pain Level: 3  Patient's Stated Pain Goal: No pain  Pain Type: Acute pain  Pain Location: Head  Pain Descriptors: Headache  Pain Frequency: Continuous  Pain Onset: Gradual  Clinical Progression: Gradually worsening  Functional Pain Assessment: Activities are not prevented  Non-Pharmaceutical Pain Intervention(s): Repositioned, Rest  Response to Pain Intervention: Patient Satisfied  Multiple Pain Sites: No    Reason for Referral  mAber Flores was referred for a bedside swallow evaluation to assess the efficiency of her swallow function, identify signs and symptoms of aspiration and make recommendations regarding safe dietary consistencies, effective compensatory strategies, and safe eating environment. Impression  Dysphagia Diagnosis: Swallow function appears grossly intact  Dysphagia Impression : Oropharyngeal swallow appears grossly WFLs at this time; adequate, palpable laryngeal elevation, complete oral clearance, and no overt s/s of aspiration w/ any consistencies. Recommend continued regular + thin diet pre-operatively. Pt expressed understanding of possible dysphagia onset and reasoning behind baseline swallow evaluation; will continue to monitor post-surgically.   Dysphagia Outcome Severity Scale: Level 6: Within functional limits/Modified independence     Treatment Plan  Requires SLP Intervention: Yes(Monitor post-surgical intervention)  Duration/Frequency of Treatment: TBD post-surgery  D/C Recommendations: To be determined  Referral To: Speech Evaluation;Dysphagia evaluation    Recommended Diet and Intervention  Diet Solids Recommendation: Regular  Liquid Consistency Recommendation: Thin  Recommended Form of Meds: PO  Recommendations: Dysphagia treatment       Compensatory Swallowing Strategies  Compensatory Swallowing Strategies: Upright as possible for all oral intake    Treatment/Goals  Dysphagia Goals: The patient will tolerate recommended diet without observed clinical signs of aspiration; The patient/caregiver will demonstrate understanding of compensatory strategies for improved swallowing safety. General  Chart Reviewed: Yes  Comments: 48 y.o. female admitted with ICH L frontal lobe. Admitted for ongoing monitoring and resection of cavernoma; unknown date at this time. Subjective  Subjective: Pt AAOx4 and resting comfortably on room air. Denied any difficulties at this time w/ speech/lang/cog or dysphagia. Agreeable to pre-operative evaluations. Behavior/Cognition: Alert; Cooperative;Pleasant mood  Temperature Spikes Noted: No  Respiratory Status: Room air  Breath Sounds: Clear  O2 Device: None (Room air)  Communication Observation: Functional(Intermittent repetitive dysfluency)  Follows Directions: Complex  Dentition: Adequate  Patient Positioning: Upright in bed  Baseline Vocal Quality: Normal  Volitional Cough: Strong  Prior Dysphagia History: None per chart review and pt report  Consistencies Administered: Reg solid; Dysphagia Soft and Bite-Sized (Dysphagia III); Thin - teaspoon; Thin - cup; Thin - straw      Vision/Hearing  Vision  Vision: Within Functional Limits  Hearing  Hearing: Within functional limits    Oral Motor Deficits  Oral/Motor  Oral Motor:  Within functional limits    Oral Phase Dysfunction  Oral Phase  Oral Phase: WFL  Oral Phase  Oral Phase - Comment: Oral phase grossly WFLs; adequate and timely mastication w/ complete A&P propulsion and clearance of oral cavity. No difficulties w/ labial seal or intraoral pressure for use of spoon/straw. Indicators of Pharyngeal Phase Dysfunction   Pharyngeal Phase  Pharyngeal Phase: WFL  Pharyngeal Phase   Pharyngeal: Pharyngeal phase appears grossly WFLs; adequate palpable laryngeal elevation w/ all consistencies. No overt s/s of aspiration w/ any trials; completed sequential swallow and 3oz water test w/o difficulties. No changes in vocal quality or respirations.     Prognosis  Prognosis  Prognosis for safe diet advancement: good  Individuals consulted  Consulted and agree with results and recommendations: Patient;RN    Education  Patient Education: Educated pt to reasoning behind evaluation, observations, diet recommendation, possible dysphagia, and ongoing monitoring  Patient Education Response: Verbalizes understanding  Safety Devices in place: Yes  Type of devices: Call light within reach       Therapy Time  SLP Individual Minutes  Time In: 0820  Time Out: 0848  Minutes: 28     SLP Total Treatment Time  Timed Code Treatment Minutes: 0 Minutes  Total Treatment Time: 29    Thank you,    James Miranda [place], [place], Micah Johnson; [phone]  Speech-Language Pathologist

## 2020-09-20 NOTE — PROGRESS NOTES
Progress Note        Date:2020       Room:5510/5510-01  Patient Name:Merry Kim     YOB: 1970     Age:50 y.o. Dysarthria, numbness          Subjective   Interval History Status: improved. Denies any HA, nausea, vomiting  Numbness improved. Speech improved. Review of Systems   ROS as mentioned above. Medications   Scheduled Meds:    levETIRAcetam  500 mg Oral BID    metoprolol succinate  25 mg Oral Daily    sodium chloride flush  10 mL Intravenous 2 times per day    atorvastatin  80 mg Oral Nightly     Continuous Infusions:     PRN Meds: sodium chloride flush, acetaminophen, promethazine **OR** ondansetron, oxyCODONE-acetaminophen    Past History    Past Medical History:   has a past medical history of Pulmonary embolism ([address] Social History:   reports that she has never smoked. She has never used smokeless tobacco. She reports current alcohol use. She reports current drug use. Drug: Marijuana. Family History: No family history on file. Physical Examination      Vitals:  /85   Pulse 68   Temp 98.5 °F (36.9 °C) (Axillary)   Resp 16   Ht 5' 5\" (1.651 m)   Wt 227 lb 15.3 oz (103.4 kg)   SpO2 100%   BMI 37.93 kg/m²   Temp (24hrs), Av.4 °F (36.9 °C), Min:97.6 °F (36.4 °C), Max:99 °F (37.2 °C)      I/O (24Hr): Intake/Output Summary (Last 24 hours) at 2020 1601  Last data filed at 2020 0145  Gross per 24 hour   Intake 320 ml   Output --   Net 320 ml       Physical Exam  Constitutional:       Appearance: Normal appearance. Neck:      Musculoskeletal: Normal range of motion and neck supple. Cardiovascular:      Rate and Rhythm: Normal rate and regular rhythm. Pulses: Normal pulses. Heart sounds: Normal heart sounds. Pulmonary:      Effort: Pulmonary effort is normal.      Breath sounds: Normal breath sounds. Abdominal:      General: Abdomen is flat. Palpations: Abdomen is soft.    Musculoskeletal: Normal range of motion. General: No swelling or tenderness. Skin:     General: Skin is warm and dry. Capillary Refill: Capillary refill takes less than 2 seconds. Neurological:      General: No focal deficit present. Mental Status: She is alert and oriented to person, place, and time. Psychiatric:         Mood and Affect: Mood normal.         Behavior: Behavior normal.           Labs/Imaging/Diagnostics   Labs:  CBC:   Recent Labs     09/18/20  0320 09/19/20  0508 09/20/20  0532   WBC 8.0 7.1 8.3   HGB 14.2 14.3 14.6   HCT 43.2 44.3 44.6   MCV 83.9 85.4 84.5    167 156     BMP:   Recent Labs     09/18/20  0320 09/19/20  0508 09/20/20  0532    137 137   K 3.6 4.2 4.1    105 104   CO2 22 21 21   BUN 9 12 14   CREATININE 0.6 0.8 0.8     Mag: No results found for: MG  LIVER PROFILE: No results for input(s): AST, ALT, LIPASE, BILIDIR, BILITOT, ALKPHOS in the last 72 hours. Invalid input(s): AMYLASE,  ALB  PT/INR:   Recent Labs     09/17/20  1729   PROTIME 12.6   INR 1.09     APTT: No results for input(s): APTT in the last 72 hours. BNP:  No results for input(s): BNP in the last 72 hours. CARDIAC ENZYMES:   Recent Labs     09/17/20  1729   TROPONINI <0.01         Imaging Last 24 Hours:  MRI BRAIN W WO CONTRAST   Final Result      1. Small left frontal lobe cavernous malformation, mildly hemorrhagic. CT head without contrast   Final Result       No significant interval change in an 8 mm ill-defined hyperdense lesion    within the left frontal lobe, at the gray matter white matter junction. Differential diagnosis includes intraparenchymal hemorrhagic contusion    secondary to trauma, hemorrhagic metastasis, or cavernous malformation. MRI BRAIN WO CONTRAST    (Results Pending)           Assessment      Active Problems:    Brain bleed ([address]  Resolved Problems:    * No resolved hospital problems.  *       Plan:        [address]  Was on xarelto   Xarelto on hold  NS consulted, will need surgery for cavernoma,  if she needs to be on anticoagulation. Discussed neurosurgery, planning for surgery tomorrow   No focal deficits noted. S/p Kcentra. Keppra per NS. Monitor BP off Nicardipine gtt     Hx of thrombophilia  Has seen  Hematology in past.  ?protein s def  Consult Hematology for anticoagulation recs moving forward.   Appreciate recommendations    Disposition:- inpatient, sx tomorrow    Karine Brooks

## 2020-09-20 NOTE — PROGRESS NOTES
Speech Language Pathology  Facility/Department: [facility] 5T ORTHO/NEURO  Initial Speech/Language/Cognitive Assessment    NAME: Natalee Maria  : 1970   MRN: [id number]  ADMISSION DATE: 2020  ADMITTING DIAGNOSIS: has Brain bleed ([address] on their problem list.  DATE ONSET: 20    Date of Eval: 2020   Evaluating Therapist: RM Frank    RECENT RESULTS  CT OF HEAD/MRI: 20  Impression         1.  Small left frontal lobe cavernous malformation, mildly hemorrhagic. Primary Complaint: Anxious regarding surgery    Pain:  Pain Assessment  Pain Assessment: 0-10  Pain Level: 3  Patient's Stated Pain Goal: No pain  Pain Type: Acute pain  Pain Location: Head  Pain Descriptors: Headache  Pain Frequency: Continuous  Pain Onset: Gradual  Clinical Progression: Gradually worsening  Functional Pain Assessment: Activities are not prevented  Non-Pharmaceutical Pain Intervention(s): Repositioned, Rest  Response to Pain Intervention: Patient Satisfied  Multiple Pain Sites: No    Assessment:  Cognitive Diagnosis: WFL  Aphasia Diagnosis: WFL  Speech Diagnosis: Mild dysfluencies  Communication Diagnosis: WFL   Diagnosis: Pt w/ functional speech/lang/cog at this time; noted to have mild dysfluencies w/ repetition at beginning of sentences (inconsistent), however, pt independent w/ use of strategies including decreased rate of speech. Educated regarding possible post-surgical difficulties, and expressed understanding. Will continue to monitor post-operatively to assess any changes in cognition and best address therapeutically. Recommendations:  Requires SLP Intervention: Yes(Monitor need for post-surgical intervention)  Duration/Frequency of Treatment: TBD post-surgery  D/C Recommendations:  To be determined  Referral To: Speech Evaluation;Dysphagia evaluation    Plan:   Goals:  Short-term Goals  Goal 1: Pt will tolerate ongoing cognitive-linguistic testing as warranted   Patient/family involved in developing goals and treatment plan: Yes    Subjective:   Previous level of function and limitations: Independent  General  Chart Reviewed: Yes  Patient assessed for rehabilitation services?: Yes  Additional Pertinent Hx: 48 y.o. female admitted with [address] L frontal lobe. Admitted for ongoing monitoring and resection of cavernoma; unknown date at this time. Family / Caregiver Present: No  General Comment  Comments: Pt AAOx4; RN reported no difficulties at this time. Pt w/ adequate understanding of dx and surgical intervention. Subjective  Subjective: Pt reported feeling anxious regarding surgery; has been looking up YouTube videos and now fearful of outcomes post-surgical intervention. Counseling and education provided. Vision  Vision: Within Functional Limits  Hearing  Hearing: Within functional limits          Objective:     Oral/Motor  Oral Motor: Within functional limits    Auditory Comprehension  Comprehension: Within Functional Limits    Expression  Primary Mode of Expression: Verbal    Verbal Expression  Verbal Expression: Within functional limits    Written Expression  Dominant Hand: Right  Written Expression: Within Functional Limits    Motor Speech  Motor Speech: Exceptions to WFL(Pt noted to have intermittent, inconsistent dysfluencies; frequent repetition at beginning of sentences.  Pt reported this is a new onset and observed to stop, take a deep breath, and speak slowly to assist.)    Pragmatics/Social Functioning  Pragmatics: Within functional limits    Cognition:      Orientation  Overall Orientation Status: Within Functional Limits  Attention  Attention: Within Functional Limits  Memory  Memory: Within Funtional Limits  Problem Solving  Problem Solving: Within Functional Limits  Numeric Reasoning  Numeric Reasoning: Within Functional Limits  Safety/Judgement  Safety/Judgement: Within Functional Limits    Additional Assessments:  Handy Cognitive Assessment (MoCA) was administered and pt scored 29/30 (>26 considered normal). Prognosis:  Speech Therapy Prognosis  Prognosis: Good  Prognosis Considerations:  Other (Comment)  Additional Comments: Possible changes post-operatively  Individuals consulted  Consulted and agree with results and recommendations: Patient;RN    Education:  Patient Education: Educated pt to assessment, reasoning behind assessment, outcomes, and ongoing monitoring  Patient Education Response: Verbalizes understanding  Safety Devices in place: Yes  Type of devices: Call light within reach    Therapy Time:   Individual Concurrent Group Co-treatment   Time In [address]         Minutes 28            Timed Code Treatment Minutes: 0 Minutes  Total Treatment Time: 29    Thank you,    Maritza Dobbins) [address]; [phone]  Speech-Language Pathologist

## 2020-09-20 NOTE — PLAN OF CARE
Problem: Pain:  Goal: Pain level will decrease  Description: Pain level will decrease  Outcome: Ongoing  Note: Pt was complaining of a headache this AM. Pt was medicated per MAR. Pt states pain has improved     Problem: Falls - Risk of:  Goal: Will remain free from falls  Description: Will remain free from falls  Outcome: Ongoing  Note: Patient alert and oriented X4, non-skid socks on, bed in lowest position and locked, side rails up X2, call light and belongings within reach, bed alarm on for safety, and fall sign posted. Will continue to monitor.

## 2020-09-20 NOTE — PLAN OF CARE
Bedside swallow evaluation completed. Please refer to EMR.     Thank you,    James Bermeo) [place], [place], [address] [facility]; [phone]  Speech-Language Pathologist

## 2020-09-21 ENCOUNTER — ANESTHESIA (OUTPATIENT)
Dept: OPERATING ROOM | Age: 50
DRG: 023 | End: 2020-09-21
Payer: COMMERCIAL

## 2020-09-21 ENCOUNTER — APPOINTMENT (OUTPATIENT)
Dept: MRI IMAGING | Age: 50
DRG: 023 | End: 2020-09-21
Attending: NEUROLOGICAL SURGERY
Payer: COMMERCIAL

## 2020-09-21 VITALS
SYSTOLIC BLOOD PRESSURE: 116 MMHG | TEMPERATURE: 95.9 F | DIASTOLIC BLOOD PRESSURE: 61 MMHG | OXYGEN SATURATION: 98 % | RESPIRATION RATE: 13 BRPM

## 2020-09-21 LAB
ABO/RH: NORMAL
ANION GAP SERPL CALCULATED.3IONS-SCNC: 12 MMOL/L (ref 3–16)
ANTIBODY SCREEN: NORMAL
BUN BLDV-MCNC: 13 MG/DL (ref 7–20)
CALCIUM SERPL-MCNC: 9 MG/DL (ref 8.3–10.6)
CHLORIDE BLD-SCNC: 103 MMOL/L (ref 99–110)
CHOLESTEROL, TOTAL: 174 MG/DL (ref 0–199)
CO2: 23 MMOL/L (ref 21–32)
CREAT SERPL-MCNC: 0.7 MG/DL (ref 0.6–1.1)
ESTIMATED AVERAGE GLUCOSE: 128.4 MG/DL
GFR AFRICAN AMERICAN: >60
GFR NON-AFRICAN AMERICAN: >60
GLUCOSE BLD-MCNC: 102 MG/DL (ref 70–99)
HBA1C MFR BLD: 6.1 %
HCG(URINE) PREGNANCY TEST: NEGATIVE
HCT VFR BLD CALC: 43.8 % (ref 36–48)
HDLC SERPL-MCNC: 68 MG/DL (ref 40–60)
HEMOGLOBIN: 14.2 G/DL (ref 12–16)
LDL CHOLESTEROL CALCULATED: 96 MG/DL
MCH RBC QN AUTO: 27.3 PG (ref 26–34)
MCHC RBC AUTO-ENTMCNC: 32.5 G/DL (ref 31–36)
MCV RBC AUTO: 84 FL (ref 80–100)
PDW BLD-RTO: 14.4 % (ref 12.4–15.4)
PLATELET # BLD: 158 K/UL (ref 135–450)
PMV BLD AUTO: 9 FL (ref 5–10.5)
POTASSIUM SERPL-SCNC: 4.1 MMOL/L (ref 3.5–5.1)
RBC # BLD: 5.22 M/UL (ref 4–5.2)
SODIUM BLD-SCNC: 138 MMOL/L (ref 136–145)
TRIGL SERPL-MCNC: 52 MG/DL (ref 0–150)
VLDLC SERPL CALC-MCNC: 10 MG/DL
WBC # BLD: 9.6 K/UL (ref 4–11)

## 2020-09-21 PROCEDURE — 36415 COLL VENOUS BLD VENIPUNCTURE: CPT

## 2020-09-21 PROCEDURE — 70552 MRI BRAIN STEM W/DYE: CPT

## 2020-09-21 PROCEDURE — 2500000003 HC RX 250 WO HCPCS: Performed by: ANESTHESIOLOGY

## 2020-09-21 PROCEDURE — 86850 RBC ANTIBODY SCREEN: CPT

## 2020-09-21 PROCEDURE — 6370000000 HC RX 637 (ALT 250 FOR IP): Performed by: INTERNAL MEDICINE

## 2020-09-21 PROCEDURE — 2580000003 HC RX 258: Performed by: NURSE PRACTITIONER

## 2020-09-21 PROCEDURE — 2500000003 HC RX 250 WO HCPCS: Performed by: NEUROLOGICAL SURGERY

## 2020-09-21 PROCEDURE — 3600000004 HC SURGERY LEVEL 4 BASE: Performed by: NEUROLOGICAL SURGERY

## 2020-09-21 PROCEDURE — 1200000000 HC SEMI PRIVATE

## 2020-09-21 PROCEDURE — 6370000000 HC RX 637 (ALT 250 FOR IP): Performed by: NURSE PRACTITIONER

## 2020-09-21 PROCEDURE — 3700000000 HC ANESTHESIA ATTENDED CARE: Performed by: NEUROLOGICAL SURGERY

## 2020-09-21 PROCEDURE — 2580000003 HC RX 258: Performed by: NURSE ANESTHETIST, CERTIFIED REGISTERED

## 2020-09-21 PROCEDURE — 6370000000 HC RX 637 (ALT 250 FOR IP): Performed by: NEUROLOGICAL SURGERY

## 2020-09-21 PROCEDURE — 7100000001 HC PACU RECOVERY - ADDTL 15 MIN: Performed by: NEUROLOGICAL SURGERY

## 2020-09-21 PROCEDURE — 3600000014 HC SURGERY LEVEL 4 ADDTL 15MIN: Performed by: NEUROLOGICAL SURGERY

## 2020-09-21 PROCEDURE — 2709999900 HC NON-CHARGEABLE SUPPLY: Performed by: NEUROLOGICAL SURGERY

## 2020-09-21 PROCEDURE — 2580000003 HC RX 258: Performed by: NEUROLOGICAL SURGERY

## 2020-09-21 PROCEDURE — 2580000003 HC RX 258: Performed by: ANESTHESIOLOGY

## 2020-09-21 PROCEDURE — 86901 BLOOD TYPING SEROLOGIC RH(D): CPT

## 2020-09-21 PROCEDURE — C1713 ANCHOR/SCREW BN/BN,TIS/BN: HCPCS | Performed by: NEUROLOGICAL SURGERY

## 2020-09-21 PROCEDURE — 83036 HEMOGLOBIN GLYCOSYLATED A1C: CPT

## 2020-09-21 PROCEDURE — 6360000002 HC RX W HCPCS: Performed by: NURSE ANESTHETIST, CERTIFIED REGISTERED

## 2020-09-21 PROCEDURE — 2720000010 HC SURG SUPPLY STERILE: Performed by: NEUROLOGICAL SURGERY

## 2020-09-21 PROCEDURE — 2500000003 HC RX 250 WO HCPCS: Performed by: NURSE ANESTHETIST, CERTIFIED REGISTERED

## 2020-09-21 PROCEDURE — 80048 BASIC METABOLIC PNL TOTAL CA: CPT

## 2020-09-21 PROCEDURE — 3700000001 HC ADD 15 MINUTES (ANESTHESIA): Performed by: NEUROLOGICAL SURGERY

## 2020-09-21 PROCEDURE — 84703 CHORIONIC GONADOTROPIN ASSAY: CPT

## 2020-09-21 PROCEDURE — 86900 BLOOD TYPING SEROLOGIC ABO: CPT

## 2020-09-21 PROCEDURE — C1894 INTRO/SHEATH, NON-LASER: HCPCS | Performed by: NEUROLOGICAL SURGERY

## 2020-09-21 PROCEDURE — 2580000003 HC RX 258: Performed by: STUDENT IN AN ORGANIZED HEALTH CARE EDUCATION/TRAINING PROGRAM

## 2020-09-21 PROCEDURE — A9576 INJ PROHANCE MULTIPACK: HCPCS | Performed by: NEUROLOGICAL SURGERY

## 2020-09-21 PROCEDURE — 00B70ZZ EXCISION OF CEREBRAL HEMISPHERE, OPEN APPROACH: ICD-10-PCS | Performed by: NEUROLOGICAL SURGERY

## 2020-09-21 PROCEDURE — 85027 COMPLETE CBC AUTOMATED: CPT

## 2020-09-21 PROCEDURE — 2000000000 HC ICU R&B

## 2020-09-21 PROCEDURE — 6360000004 HC RX CONTRAST MEDICATION: Performed by: NEUROLOGICAL SURGERY

## 2020-09-21 PROCEDURE — 7100000000 HC PACU RECOVERY - FIRST 15 MIN: Performed by: NEUROLOGICAL SURGERY

## 2020-09-21 PROCEDURE — 6370000000 HC RX 637 (ALT 250 FOR IP): Performed by: STUDENT IN AN ORGANIZED HEALTH CARE EDUCATION/TRAINING PROGRAM

## 2020-09-21 DEVICE — SCREW BNE L4MM DIA1.5MM CRANIOFACIAL TI SELF DRL: Type: IMPLANTABLE DEVICE | Site: CRANIAL | Status: FUNCTIONAL

## 2020-09-21 DEVICE — PLATE BNE L12MM THK0.4MM 2 H CRANIOMAXILLOFACIAL BILAT BLU: Type: IMPLANTABLE DEVICE | Site: CRANIAL | Status: FUNCTIONAL

## 2020-09-21 DEVICE — COVER BUR H DIA17MM 6 H CRANIOMAXILLOFACIAL BLU TI: Type: IMPLANTABLE DEVICE | Site: CRANIAL | Status: FUNCTIONAL

## 2020-09-21 RX ORDER — SODIUM CHLORIDE 0.9 % (FLUSH) 0.9 %
10 SYRINGE (ML) INJECTION EVERY 12 HOURS SCHEDULED
Status: DISCONTINUED | OUTPATIENT
Start: 2020-09-21 | End: 2020-09-24 | Stop reason: HOSPADM

## 2020-09-21 RX ORDER — PHENYLEPHRINE HYDROCHLORIDE 10 MG/ML
INJECTION INTRAVENOUS PRN
Status: DISCONTINUED | OUTPATIENT
Start: 2020-09-21 | End: 2020-09-21 | Stop reason: SDUPTHER

## 2020-09-21 RX ORDER — CEFAZOLIN SODIUM 1 G/3ML
INJECTION, POWDER, FOR SOLUTION INTRAMUSCULAR; INTRAVENOUS PRN
Status: DISCONTINUED | OUTPATIENT
Start: 2020-09-21 | End: 2020-09-21 | Stop reason: SDUPTHER

## 2020-09-21 RX ORDER — DEXAMETHASONE SODIUM PHOSPHATE 4 MG/ML
INJECTION, SOLUTION INTRA-ARTICULAR; INTRALESIONAL; INTRAMUSCULAR; INTRAVENOUS; SOFT TISSUE PRN
Status: DISCONTINUED | OUTPATIENT
Start: 2020-09-21 | End: 2020-09-21 | Stop reason: SDUPTHER

## 2020-09-21 RX ORDER — OXYCODONE HYDROCHLORIDE 5 MG/1
10 TABLET ORAL EVERY 4 HOURS PRN
Status: DISCONTINUED | OUTPATIENT
Start: 2020-09-21 | End: 2020-09-24 | Stop reason: HOSPADM

## 2020-09-21 RX ORDER — HYDROCODONE BITARTRATE AND ACETAMINOPHEN 5; 325 MG/1; MG/1
1 TABLET ORAL
Status: DISCONTINUED | OUTPATIENT
Start: 2020-09-21 | End: 2020-09-21 | Stop reason: HOSPADM

## 2020-09-21 RX ORDER — 0.9 % SODIUM CHLORIDE 0.9 %
500 INTRAVENOUS SOLUTION INTRAVENOUS
Status: DISCONTINUED | OUTPATIENT
Start: 2020-09-21 | End: 2020-09-21 | Stop reason: HOSPADM

## 2020-09-21 RX ORDER — LIDOCAINE HYDROCHLORIDE 20 MG/ML
INJECTION, SOLUTION INTRAVENOUS PRN
Status: DISCONTINUED | OUTPATIENT
Start: 2020-09-21 | End: 2020-09-21 | Stop reason: SDUPTHER

## 2020-09-21 RX ORDER — SODIUM CHLORIDE, SODIUM LACTATE, POTASSIUM CHLORIDE, AND CALCIUM CHLORIDE .6; .31; .03; .02 G/100ML; G/100ML; G/100ML; G/100ML
IRRIGANT IRRIGATION PRN
Status: DISCONTINUED | OUTPATIENT
Start: 2020-09-21 | End: 2020-09-21 | Stop reason: HOSPADM

## 2020-09-21 RX ORDER — DIPHENHYDRAMINE HYDROCHLORIDE 50 MG/ML
12.5 INJECTION INTRAMUSCULAR; INTRAVENOUS
Status: DISCONTINUED | OUTPATIENT
Start: 2020-09-21 | End: 2020-09-21 | Stop reason: HOSPADM

## 2020-09-21 RX ORDER — HYDROMORPHONE HCL 110MG/55ML
PATIENT CONTROLLED ANALGESIA SYRINGE INTRAVENOUS PRN
Status: DISCONTINUED | OUTPATIENT
Start: 2020-09-21 | End: 2020-09-21 | Stop reason: SDUPTHER

## 2020-09-21 RX ORDER — BACITRACIN ZINC AND POLYMYXIN B SULFATE 500; 1000 [USP'U]/G; [USP'U]/G
OINTMENT TOPICAL PRN
Status: DISCONTINUED | OUTPATIENT
Start: 2020-09-21 | End: 2020-09-21 | Stop reason: HOSPADM

## 2020-09-21 RX ORDER — BUPIVACAINE HYDROCHLORIDE AND EPINEPHRINE 5; 5 MG/ML; UG/ML
INJECTION, SOLUTION EPIDURAL; INTRACAUDAL; PERINEURAL PRN
Status: DISCONTINUED | OUTPATIENT
Start: 2020-09-21 | End: 2020-09-21 | Stop reason: HOSPADM

## 2020-09-21 RX ORDER — SODIUM CHLORIDE 0.9 % (FLUSH) 0.9 %
10 SYRINGE (ML) INJECTION PRN
Status: DISCONTINUED | OUTPATIENT
Start: 2020-09-21 | End: 2020-09-22

## 2020-09-21 RX ORDER — GLYCOPYRROLATE 1 MG/5 ML
SYRINGE (ML) INTRAVENOUS PRN
Status: DISCONTINUED | OUTPATIENT
Start: 2020-09-21 | End: 2020-09-21 | Stop reason: SDUPTHER

## 2020-09-21 RX ORDER — SODIUM CHLORIDE 9 MG/ML
INJECTION, SOLUTION INTRAVENOUS CONTINUOUS PRN
Status: DISCONTINUED | OUTPATIENT
Start: 2020-09-21 | End: 2020-09-21 | Stop reason: SDUPTHER

## 2020-09-21 RX ORDER — OXYCODONE HYDROCHLORIDE 5 MG/1
5 TABLET ORAL EVERY 4 HOURS PRN
Status: DISCONTINUED | OUTPATIENT
Start: 2020-09-21 | End: 2020-09-24 | Stop reason: HOSPADM

## 2020-09-21 RX ORDER — ACETAMINOPHEN 325 MG/1
650 TABLET ORAL EVERY 4 HOURS PRN
Status: DISCONTINUED | OUTPATIENT
Start: 2020-09-21 | End: 2020-09-24 | Stop reason: HOSPADM

## 2020-09-21 RX ORDER — HYDRALAZINE HYDROCHLORIDE 20 MG/ML
5 INJECTION INTRAMUSCULAR; INTRAVENOUS EVERY 10 MIN PRN
Status: DISCONTINUED | OUTPATIENT
Start: 2020-09-21 | End: 2020-09-21 | Stop reason: HOSPADM

## 2020-09-21 RX ORDER — MAGNESIUM SULFATE HEPTAHYDRATE 500 MG/ML
INJECTION, SOLUTION INTRAMUSCULAR; INTRAVENOUS PRN
Status: DISCONTINUED | OUTPATIENT
Start: 2020-09-21 | End: 2020-09-21 | Stop reason: SDUPTHER

## 2020-09-21 RX ORDER — ROCURONIUM BROMIDE 10 MG/ML
INJECTION, SOLUTION INTRAVENOUS PRN
Status: DISCONTINUED | OUTPATIENT
Start: 2020-09-21 | End: 2020-09-21 | Stop reason: SDUPTHER

## 2020-09-21 RX ORDER — SODIUM CHLORIDE 9 MG/ML
INJECTION, SOLUTION INTRAVENOUS CONTINUOUS
Status: DISCONTINUED | OUTPATIENT
Start: 2020-09-21 | End: 2020-09-22

## 2020-09-21 RX ORDER — ONDANSETRON 2 MG/ML
4 INJECTION INTRAMUSCULAR; INTRAVENOUS
Status: DISCONTINUED | OUTPATIENT
Start: 2020-09-21 | End: 2020-09-21 | Stop reason: HOSPADM

## 2020-09-21 RX ORDER — PROPOFOL 10 MG/ML
INJECTION, EMULSION INTRAVENOUS PRN
Status: DISCONTINUED | OUTPATIENT
Start: 2020-09-21 | End: 2020-09-21 | Stop reason: SDUPTHER

## 2020-09-21 RX ORDER — ONDANSETRON 2 MG/ML
INJECTION INTRAMUSCULAR; INTRAVENOUS PRN
Status: DISCONTINUED | OUTPATIENT
Start: 2020-09-21 | End: 2020-09-21 | Stop reason: SDUPTHER

## 2020-09-21 RX ADMIN — Medication 10 ML: at 07:59

## 2020-09-21 RX ADMIN — HYDROMORPHONE HYDROCHLORIDE 1 MG: 2 INJECTION, SOLUTION INTRAMUSCULAR; INTRAVENOUS; SUBCUTANEOUS at 11:37

## 2020-09-21 RX ADMIN — LEVETIRACETAM 500 MG: 500 TABLET, FILM COATED ORAL at 07:59

## 2020-09-21 RX ADMIN — DEXMEDETOMIDINE HYDROCHLORIDE 20 MCG: 100 INJECTION, SOLUTION INTRAVENOUS at 11:12

## 2020-09-21 RX ADMIN — ATORVASTATIN CALCIUM 80 MG: 80 TABLET, FILM COATED ORAL at 20:22

## 2020-09-21 RX ADMIN — LIDOCAINE HYDROCHLORIDE 100 MG: 20 INJECTION, SOLUTION INTRAVENOUS at 11:14

## 2020-09-21 RX ADMIN — PROPOFOL 150 MG: 10 INJECTION, EMULSION INTRAVENOUS at 11:14

## 2020-09-21 RX ADMIN — DEXMEDETOMIDINE HYDROCHLORIDE 20 MCG: 100 INJECTION, SOLUTION INTRAVENOUS at 11:10

## 2020-09-21 RX ADMIN — CEFAZOLIN SODIUM 2000 MG: 1 POWDER, FOR SOLUTION INTRAMUSCULAR; INTRAVENOUS at 11:37

## 2020-09-21 RX ADMIN — Medication 0.2 MG: at 11:39

## 2020-09-21 RX ADMIN — REMIFENTANIL HYDROCHLORIDE 0.1 MCG/KG/MIN: 1 INJECTION, POWDER, LYOPHILIZED, FOR SOLUTION INTRAVENOUS at 11:14

## 2020-09-21 RX ADMIN — ROCURONIUM BROMIDE 50 MG: 10 INJECTION INTRAVENOUS at 11:14

## 2020-09-21 RX ADMIN — SODIUM CHLORIDE: 9 INJECTION, SOLUTION INTRAVENOUS at 15:02

## 2020-09-21 RX ADMIN — ONDANSETRON 4 MG: 2 INJECTION INTRAMUSCULAR; INTRAVENOUS at 11:39

## 2020-09-21 RX ADMIN — SODIUM CHLORIDE: 9 INJECTION, SOLUTION INTRAVENOUS at 18:53

## 2020-09-21 RX ADMIN — ACETAMINOPHEN 650 MG: 325 TABLET ORAL at 07:59

## 2020-09-21 RX ADMIN — GADOTERIDOL 20 ML: 279.3 INJECTION, SOLUTION INTRAVENOUS at 08:27

## 2020-09-21 RX ADMIN — MAGNESIUM SULFATE HEPTAHYDRATE 2 G: 500 INJECTION, SOLUTION INTRAMUSCULAR; INTRAVENOUS at 11:37

## 2020-09-21 RX ADMIN — OXYCODONE HYDROCHLORIDE AND ACETAMINOPHEN 1 TABLET: 5; 325 TABLET ORAL at 21:39

## 2020-09-21 RX ADMIN — Medication 10 ML: at 20:32

## 2020-09-21 RX ADMIN — PHENYLEPHRINE HYDROCHLORIDE 80 MCG: 10 INJECTION INTRAVENOUS at 11:20

## 2020-09-21 RX ADMIN — ROCURONIUM BROMIDE 25 MG: 10 INJECTION INTRAVENOUS at 11:47

## 2020-09-21 RX ADMIN — SUGAMMADEX 200 MG: 100 INJECTION, SOLUTION INTRAVENOUS at 12:41

## 2020-09-21 RX ADMIN — LEVETIRACETAM 500 MG: 500 TABLET, FILM COATED ORAL at 20:22

## 2020-09-21 RX ADMIN — SODIUM CHLORIDE: 9 INJECTION, SOLUTION INTRAVENOUS at 11:15

## 2020-09-21 RX ADMIN — HYDROMORPHONE HYDROCHLORIDE 1 MG: 2 INJECTION, SOLUTION INTRAMUSCULAR; INTRAVENOUS; SUBCUTANEOUS at 11:15

## 2020-09-21 RX ADMIN — OXYCODONE 10 MG: 5 TABLET ORAL at 18:32

## 2020-09-21 RX ADMIN — SODIUM CHLORIDE: 900 INJECTION, SOLUTION INTRAVENOUS at 11:21

## 2020-09-21 RX ADMIN — PHENYLEPHRINE HYDROCHLORIDE 80 MCG: 10 INJECTION INTRAVENOUS at 11:43

## 2020-09-21 RX ADMIN — DEXAMETHASONE SODIUM PHOSPHATE 4 MG: 4 INJECTION, SOLUTION INTRAMUSCULAR; INTRAVENOUS at 11:39

## 2020-09-21 RX ADMIN — SODIUM CHLORIDE: 9 INJECTION, SOLUTION INTRAVENOUS at 11:25

## 2020-09-21 RX ADMIN — METOPROLOL SUCCINATE 25 MG: 50 TABLET, EXTENDED RELEASE ORAL at 07:59

## 2020-09-21 RX ADMIN — OXYCODONE 10 MG: 5 TABLET ORAL at 23:26

## 2020-09-21 ASSESSMENT — PULMONARY FUNCTION TESTS
PIF_VALUE: 23
PIF_VALUE: 0
PIF_VALUE: 22
PIF_VALUE: 23
PIF_VALUE: 20
PIF_VALUE: 19
PIF_VALUE: 23
PIF_VALUE: 19
PIF_VALUE: 21
PIF_VALUE: 6
PIF_VALUE: 1
PIF_VALUE: 21
PIF_VALUE: 22
PIF_VALUE: 23
PIF_VALUE: 21
PIF_VALUE: 22
PIF_VALUE: 21
PIF_VALUE: 4
PIF_VALUE: 0
PIF_VALUE: 20
PIF_VALUE: 21
PIF_VALUE: 23
PIF_VALUE: 22
PIF_VALUE: 23
PIF_VALUE: 22
PIF_VALUE: 20
PIF_VALUE: 0
PIF_VALUE: 23
PIF_VALUE: 23
PIF_VALUE: 22
PIF_VALUE: 23
PIF_VALUE: 27
PIF_VALUE: 17
PIF_VALUE: 23
PIF_VALUE: 22
PIF_VALUE: 17
PIF_VALUE: 22
PIF_VALUE: 20
PIF_VALUE: 23
PIF_VALUE: 21
PIF_VALUE: 21
PIF_VALUE: 0
PIF_VALUE: 22
PIF_VALUE: 24
PIF_VALUE: 3
PIF_VALUE: 21
PIF_VALUE: 22
PIF_VALUE: 19
PIF_VALUE: 23
PIF_VALUE: 23
PIF_VALUE: 17
PIF_VALUE: 27
PIF_VALUE: 20
PIF_VALUE: 1
PIF_VALUE: 22
PIF_VALUE: 22
PIF_VALUE: 10
PIF_VALUE: 23
PIF_VALUE: 0
PIF_VALUE: 2
PIF_VALUE: 23
PIF_VALUE: 10
PIF_VALUE: 23
PIF_VALUE: 23
PIF_VALUE: 22
PIF_VALUE: 0
PIF_VALUE: 23
PIF_VALUE: 23
PIF_VALUE: 0
PIF_VALUE: 23
PIF_VALUE: 22
PIF_VALUE: 23
PIF_VALUE: 23
PIF_VALUE: 22
PIF_VALUE: 0
PIF_VALUE: 23
PIF_VALUE: 20
PIF_VALUE: 20
PIF_VALUE: 24
PIF_VALUE: 39
PIF_VALUE: 20
PIF_VALUE: 20
PIF_VALUE: 22
PIF_VALUE: 36
PIF_VALUE: 23
PIF_VALUE: 21
PIF_VALUE: 22
PIF_VALUE: 0
PIF_VALUE: 0
PIF_VALUE: 4
PIF_VALUE: 23
PIF_VALUE: 22
PIF_VALUE: 22
PIF_VALUE: 23
PIF_VALUE: 25
PIF_VALUE: 2
PIF_VALUE: 22
PIF_VALUE: 4
PIF_VALUE: 22
PIF_VALUE: 23

## 2020-09-21 ASSESSMENT — PAIN DESCRIPTION - FREQUENCY: FREQUENCY: INTERMITTENT

## 2020-09-21 ASSESSMENT — PAIN - FUNCTIONAL ASSESSMENT: PAIN_FUNCTIONAL_ASSESSMENT: ACTIVITIES ARE NOT PREVENTED

## 2020-09-21 ASSESSMENT — PAIN DESCRIPTION - ONSET: ONSET: ON-GOING

## 2020-09-21 ASSESSMENT — PAIN SCALES - GENERAL
PAINLEVEL_OUTOF10: 6
PAINLEVEL_OUTOF10: 0
PAINLEVEL_OUTOF10: 9
PAINLEVEL_OUTOF10: 0
PAINLEVEL_OUTOF10: 8
PAINLEVEL_OUTOF10: 10
PAINLEVEL_OUTOF10: 0
PAINLEVEL_OUTOF10: 0

## 2020-09-21 ASSESSMENT — PAIN DESCRIPTION - LOCATION: LOCATION: HEAD

## 2020-09-21 ASSESSMENT — PAIN DESCRIPTION - PAIN TYPE: TYPE: ACUTE PAIN;CHRONIC PAIN

## 2020-09-21 ASSESSMENT — LIFESTYLE VARIABLES: SMOKING_STATUS: 0

## 2020-09-21 ASSESSMENT — PAIN DESCRIPTION - PROGRESSION: CLINICAL_PROGRESSION: NOT CHANGED

## 2020-09-21 ASSESSMENT — PAIN DESCRIPTION - DESCRIPTORS: DESCRIPTORS: HEADACHE

## 2020-09-21 NOTE — INTERVAL H&P NOTE
Update History & Physical    The patient's History and Physical of September 17, 2020 was reviewed with the patient and I examined the patient. There was discovery of a left frontal cavernoma on MRI as the source of hemorrhage, felt to be a significant risk of future disability from hemorrhage due to her need for chronic anticoagulation. The surgical site was confirmed by the patient and me. Plan: The risks, benefits, expected outcome, and alternative to the recommended procedure have been discussed with the patient. Patient understands and wants to proceed with the procedure.      Electronically signed by Bronwyn Payan MD on 9/21/2020 at 10:08 AM

## 2020-09-21 NOTE — ANESTHESIA POSTPROCEDURE EVALUATION
Department of Anesthesiology  Postprocedure Note    Patient: Eliel Maynard  MRN: [id number]  YOB: 1970  Date of evaluation: 9/21/2020  Time:  6:18 PM     Procedure Summary     Date:  09/21/20 Room / Location:  [facility] [address] / [facility]    Anesthesia Start:  1059 Anesthesia Stop:  1257    Procedure:  LEFT FRONTAL CRANIOTOMY FOR RESECTION OF CAVERNOMA (Left ) Diagnosis:  (LEFT FRONTAL HEMORRHAGE)    Surgeon: Addie Cronin MD Responsible Provider:  Tacos De Paz DO    Anesthesia Type:  general ASA Status:  3          Anesthesia Type: No value filed. Radha Phase I: Radha Score: 9    Radha Phase II:      Last vitals: Reviewed and per EMR flowsheets.        Anesthesia Post Evaluation    Patient location during evaluation: PACU  Patient participation: complete - patient participated  Level of consciousness: awake and alert  Airway patency: patent  Nausea & Vomiting: no nausea and no vomiting  Cardiovascular status: blood pressure returned to baseline  Respiratory status: acceptable  Hydration status: euvolemic

## 2020-09-21 NOTE — PROGRESS NOTES
Progress Note        Date:2020       Room:OR/NONE  Patient Name:Merry Kim     YOB: 1970     Age:50 y.o. Dysarthria, numbness          Subjective   Interval History Status: improved. Denies any HA, nausea, vomiting  Numbness improved. Speech improved. Surgery today      Review of Systems   ROS as mentioned above. Medications   Scheduled Meds:    ceFAZolin  1 g Intravenous On Call to OR    levETIRAcetam  500 mg Oral BID    metoprolol succinate  25 mg Oral Daily    sodium chloride flush  10 mL Intravenous 2 times per day    atorvastatin  80 mg Oral Nightly     Continuous Infusions:    remifentanil (UTLIVA) infusion Stopped (20 1231)     PRN Meds: HYDROmorphone, HYDROmorphone, HYDROcodone 5 mg - acetaminophen, diphenhydrAMINE, sodium chloride, ondansetron, hydrALAZINE, bupivacaine-EPINEPHrine PF, lactated ringers, bacitracin-polymyxin b, sodium chloride flush, acetaminophen, promethazine **OR** ondansetron, oxyCODONE-acetaminophen    Past History    Past Medical History:   has a past medical history of Pulmonary embolism ([facility] [address] Social History:   reports that she has never smoked. She has never used smokeless tobacco. She reports current alcohol use. She reports current drug use. Drug: Marijuana. Family History: History reviewed. No pertinent family history. Physical Examination      Vitals:  /85   Pulse 77   Temp 96.6 °F (35.9 °C) (Temporal)   Resp 15   Ht 5' 5\" (1.651 m)   Wt 227 lb 15.3 oz (103.4 kg)   SpO2 96%   BMI 37.93 kg/m²   Temp (24hrs), Av.9 °F (36.1 °C), Min:95.9 °F (35.5 °C), Max:98.8 °F (37.1 °C)      I/O (24Hr): Intake/Output Summary (Last 24 hours) at 2020 1437  Last data filed at 2020 1433  Gross per 24 hour   Intake 1440 ml   Output 205 ml   Net 1235 ml       Physical Exam  Constitutional:       Appearance: Normal appearance. Neck:      Musculoskeletal: Normal range of motion and neck supple.    Cardiovascular: Rate and Rhythm: Normal rate and regular rhythm. Pulses: Normal pulses. Heart sounds: Normal heart sounds. Pulmonary:      Effort: Pulmonary effort is normal.      Breath sounds: Normal breath sounds. Abdominal:      General: Abdomen is flat. Palpations: Abdomen is soft. Musculoskeletal: Normal range of motion. General: No swelling or tenderness. Skin:     General: Skin is warm and dry. Capillary Refill: Capillary refill takes less than 2 seconds. Neurological:      General: No focal deficit present. Mental Status: She is alert and oriented to person, place, and time. Psychiatric:         Mood and Affect: Mood normal.         Behavior: Behavior normal.           Labs/Imaging/Diagnostics   Labs:  CBC:   Recent Labs     09/19/20  0508 09/20/20  0532 09/21/20  0527   WBC 7.1 8.3 9.6   HGB 14.3 14.6 14.2   HCT 44.3 44.6 43.8   MCV 85.4 84.5 84.0    156 158     BMP:   Recent Labs     09/19/20  0508 09/20/20  0532 09/21/20  0527    137 138   K 4.2 4.1 4.1    104 103   CO2 21 21 23   BUN 12 14 13   CREATININE 0.8 0.8 0.7     Mag: No results found for: MG  LIVER PROFILE: No results for input(s): AST, ALT, LIPASE, BILIDIR, BILITOT, ALKPHOS in the last 72 hours. Invalid input(s): AMYLASE,  ALB  PT/INR:   No results for input(s): PROTIME, INR in the last 72 hours. APTT: No results for input(s): APTT in the last 72 hours. BNP:  No results for input(s): BNP in the last 72 hours. CARDIAC ENZYMES:   No results for input(s): CKTOTAL, CKMB, TROPONINI in the last 72 hours. Imaging Last 24 Hours:  MRI BRAIN W CONTRAST   Final Result      Stable small focus of hemorrhage within the left frontal lobe likely representing a hemorrhagic cavernoma. No visible associated developmental venous anomaly. MRI BRAIN W WO CONTRAST   Final Result      1. Small left frontal lobe cavernous malformation, mildly hemorrhagic.          CT head without contrast   Final Result       No significant interval change in an 8 mm ill-defined hyperdense lesion    within the left frontal lobe, at the gray matter white matter junction. Differential diagnosis includes intraparenchymal hemorrhagic contusion    secondary to trauma, hemorrhagic metastasis, or cavernous malformation. Assessment      Active Problems:    Brain bleed ([address]  Resolved Problems:    * No resolved hospital problems. *       Plan:        ICH  Was on xarelto , Xarelto on hold  NS consulted, , sx today  No focal deficits noted. S/p Kcentra. Keppra per NS. Monitor BP off Nicardipine gtt     Hx of thrombophilia  Has seen  Hematology in past.  ?protein s def  Consult Hematology for anticoagulation recs moving forward.   Appreciate recommendations    Disposition:- inpatient, sx today     Alyse Guerrier

## 2020-09-21 NOTE — PROGRESS NOTES
Pt is alert and oriented times 4. VSS, no neuro deficits and NIHSS is a 0. Pt NPO since midnight. Pt denies pain. All fall precautions in place. Will continue to monitor.

## 2020-09-21 NOTE — PROGRESS NOTES
PACU Transfer Note    Vitals:    09/21/20 1505   BP: 139/83   Pulse: 72   Resp: 17   Temp: 97.6   SpO2: 100%     Report given to Kirit Ritchie ICU   In: 250 [P.O.:20; I.V.:230]  Out: -     Pain assessment:  present - adequately treated  Pain Level: 0    Report given to Receiving unit RN.    9/21/2020 3:11 PM

## 2020-09-21 NOTE — OP NOTE
Operative Note      Patient: Emily Hartmann  YOB: 1970  MRN: [id number]    Date of Procedure: 9/21/2020    Pre-Op Diagnosis: LEFT FRONTAL HEMORRHAGE    Post-Op Diagnosis: Same       Procedure(s):  LEFT FRONTAL CRANIOTOMY FOR RESECTION OF CAVERNOMA    Surgeon(s):  La Robles MD    Assistant:   MD Elma Batista SA    Anesthesia: General    Estimated Blood Loss (mL): less than 50     Complications: None    Specimens:   Left frontal cavernoma  Implants:  none    Drains: none    Findings: hemorrhagic capsule, normal cortex in resection bed    Detailed Description of Procedure: The patient was brought into the OR where she was induced under general anesthesia and intubated. She was positioned supine with her neck in a neutral position and the head fixed in [facility]. The Brain Lab stereotactic system was registered and confirmed to be accurate with anatomic landmarks. The most direct route to the lesion was identified and marked, then her hair shaved to permit an incision behind the hairline. This was marked and the site prepped and draped in sterile fashion. A timeout was performed to confirm the patient, site, presence of compression boots and administration of antibiotics. The incision was made with a #10 blade and the skin hemostasis maintained with Selina clips. A self retaining retractor was placed and the entry site confirmed with Brain Lab. The craniotomy was turned with a Midas Sage drill and the bone removed as a single piece. The dura was partially opened already so was mobilized and tacked back. The entry site in the cortex was confirmed with Brain Lab. I cauterized the mallika with bipolar cautery and opened the superficial cortex. The route to the lesion was opened with a Vycor tubular retractor using the Brain Lab probe. The introducer was removed the the lesion identified.     I began dissecting around the periphery of the lesion, when it delivered itself immediately and partially liquified, coming out in the suction immediately. I inspected around the periphery and sampled anything on the border that looked slightly abnormal.  This was sent for permanent pathology. The resection bed was inspected for hemostasis, then filled with FloSeal.  Five minutes later, the excess was irrigated out and the bed inspected again. The retractor was removed and hemostasis confirmed along the tract. The dura was covered with gelfoam.  The bone was fixed back in place with Synthes plates and screws. The galea was closed with 2-0 vicryl and the skin closed with staples. The wound was dressed with PSO, telfa, dressing sponge and paper tape. She was removed from the crown of thorns, awakened from anesthesia and extubated without difficulty.     Electronically signed by Jagdeep Ddoge MD on 9/21/2020 at 10:35 AM

## 2020-09-21 NOTE — PROGRESS NOTES
NEUROSURGERY     KELVIN CRAWLEY   [id number]   1970   9/21/2020    Interval History:  Hospital Day #4      Subjective: Patient has headache 6/10 this morning, no other specific complaints. Just returned from MRI, sitting up in bed. Objective:  BP (!) 152/94   Pulse 70   Temp 98.3 °F (36.8 °C) (Oral)   Resp 16   Ht 5' 5\" (1.651 m)   Wt 227 lb 15.3 oz (103.4 kg)   SpO2 97%   BMI 37.93 kg/m²     Labs:  Recent Labs     09/19/20  0508 09/20/20  0532 09/21/20  0527    137 138    104 103   CO2 21 21 23   BUN 12 14 13   CREATININE 0.8 0.8 0.7   GLUCOSE 90 99 102*     Recent Labs     09/19/20  0508 09/20/20  0532 09/21/20  0527   WBC 7.1 8.3 9.6   RBC 5.19 5.28* 5.22*       Neurologic Exam:  GCS:  4 - Opens eyes on own  5 - Alert and oriented  6 - Follows simple motor commands    Mental Status: Awake, alert, oriented x 4, speech clear and appropriate  Language: No aphasia or dysarthria noted  Sensation: Intact to all extremities to light touch  Coordination: Intact    Cranial Nerves:  II: Visual acuity not tested, visual fields intact, denies new visual changes / diplopia  III, IV, VI: PERRL, 3 mm bilaterally, EOMI, no nystagmus noted  V: Facial sensation intact bilaterally to touch  VII: Face symmetric  VIII: Hearing intact bilaterally to spoken voice  IX: Palate movement equal bilaterally  XI: Shoulder shrug equal bilaterally  XII: Tongue midline    Musculoskeletal:   Gait: Not tested   Tone: normal   Motor strength:    Right  Left    Right  Left    Deltoid  5 5  Hip Flex  5 5   Biceps  5 5  Knee Extensors  5 5   Triceps  5 5  Knee Flexors  5 5   Wrist Ext  5 5  Ankle Dorsiflex. 5 5   Wrist Flex  5 5  Ankle Plantarflex. 5 5   Handgrip  5 5  Ext Edmundo Longus  5 5   Thumb Ext  5 5           Assessment   48year old female with left frontal hemorrhage, which appears to be from a cavernoma. Plan:        1.   OR today for left frontal craniotomy, resection of cavernoma         2.    Neurologic exams frequency: q 4 hours, change to q 1 post operatively   3. For change in exam MUST contact neurosurgery team along with critical care or primary team  4. Maintain SBP <160; If PRN med insufficient, then may start Nicardipine infusion  5. SCDs for DVT prophylaxis  6. Seizure prophylaxis: Keppra 500mg BID  7. PT/OT post op  8. NPO for surgery, will advance diet as tolerates post op    DISPO-inpatient, ICU after OR     Patient was discussed with Dr. Sabra Molina who agrees with above assessment and plan.      Electronically signed  9/21/2020 9:35 AM

## 2020-09-21 NOTE — ANESTHESIA PROCEDURE NOTES
Arterial Line:    An arterial line was placed using surface landmarks, in the OR for the following indication(s): continuous blood pressure monitoring. A 20 gauge (size), 1 and 3/4 inch (length), Arrow (type) catheter was placed, Seldinger technique used, into the right radial artery, secured by suture and Tegaderm and biopatch . Anesthesia type: General    Events:  patient tolerated procedure well with no complications and EBL < 5mL.   9/21/2020 11:27 [id number] 11:31 AM  Anesthesiologist: Ben Steele DO  Performed: Anesthesiologist   Preanesthetic Checklist  Completed: patient identified, site marked, surgical consent, pre-op evaluation, timeout performed, IV checked, risks and benefits discussed, monitors and equipment checked, anesthesia consent given, oxygen available and patient being monitored

## 2020-09-21 NOTE — PLAN OF CARE
Problem: Falls - Risk of:  Goal: Will remain free from falls  Description: Fall precautions in place. Bed is in lowest position, wheels locked, alarm on, non-skid socks on. Call light and bedside table within reach. Patient calls out appropriately. Patient is up x1 assist. Will continue to assess and monitor. Problem: Pain:  Goal: Pain level will decrease  Description: Pain level will decrease. Pt reports headache, tylenol given PRN per pt request.  Outcome: Ongoing     Problem: HEMODYNAMIC STATUS  Goal: Patient has stable vital signs and fluid balance  Outcome: Ongoing  VSS.

## 2020-09-21 NOTE — ANESTHESIA PRE PROCEDURE
Department of Anesthesiology  Preprocedure Note       Name:  Edi Campbell   Age:  48 y.o.  :  1970                                          MRN:  [id number]         Date:  2020      Surgeon: Salima Monaco):  Celine Villanueva MD    Procedure: Procedure(s):  LEFT FRONTAL CRANIOTOMY FOR RESECTION OF CAVERNOMA    Medications prior to admission:   Prior to Admission medications    Medication Sig Start Date End Date Taking?  Authorizing Provider   levETIRAcetam (KEPPRA) 500 MG tablet Take 1 tablet by mouth 2 times daily 20  Yes Barbara Baron MD   atorvastatin (LIPITOR) 80 MG tablet Take 1 tablet by mouth nightly 20  Yes Barbara Baron MD   metoprolol succinate (TOPROL XL) 25 MG extended release tablet Take 1 tablet by mouth daily 20  Yes Barbara Baron MD       Current medications:    Current Facility-Administered Medications   Medication Dose Route Frequency Provider Last Rate Last Dose    ceFAZolin (ANCEF) 1 g in sodium chloride 0.9 % 50 mL IVPB (mini-bag)  1 g Intravenous On Call to [address] [place] [address] MD Kay        remifentanil (ULTIVA) 2,000 mcg in sodium chloride 0.9 % 100 mL infusion  0.1 mcg/kg/min Intravenous Continuous [place] Maul, DO 31 mL/hr at 20 1114 0.1 mcg/kg/min at 20 1114    HYDROmorphone (DILAUDID) injection 0.25 mg  0.25 mg Intravenous Q5 Min PRN [place] Maul, DO        HYDROmorphone (DILAUDID) injection 0.5 mg  0.5 mg Intravenous Q5 Min PRN [place] Maul, DO        HYDROcodone-acetaminophen [facility]) 5-325 MG per tablet 1 tablet  1 tablet Oral Once PRN [place] Maul, DO        diphenhydrAMINE (BENADRYL) injection 12.5 mg  12.5 mg Intravenous Once PRN [place] Maul, DO        0.9 % sodium chloride bolus  500 mL Intravenous Once PRN [place] Maul, DO        ondansetron [facility]) injection 4 mg  4 mg Intravenous Once PRN [place] Maul, DO        hydrALAZINE (APRESOLINE) injection 5 mg  5 mg Intravenous Q10 Min PRN [place] Mort injection    PRN Anastasiya Hewitt, APRN - CRNA   2,000 mg at 09/21/20 1137    magnesium sulfate injection    PRN Anastasiya Hewitt, APRN - CRNA   2 g at 09/21/20 1137    ondansetron (ZOFRAN) injection    PRN Anastasiya Hewitt, APRN - CRNA   4 mg at 09/21/20 1139    dexamethasone (DECADRON) injection    PRN Anastasiya Hewitt, APRN - CRNA   4 mg at 09/21/20 1139    glycopyrrolate (ROBINUL) injection    PRN Anastasiya Hewitt, APRN - CRNA   0.2 mg at 09/21/20 1139    phenylephrine (VAZCULEP) injection    PRN Anastasiya Hewitt, APRN - CRNA   80 mcg at 09/21/20 1143       Allergies:  No Known Allergies    Problem List:    Patient Active Problem List   Diagnosis Code    [facility] I61.9       Past Medical History:        Diagnosis Date    Pulmonary embolism ([facility] [address] 2015    started on xarelto       Past Surgical History:        Procedure Laterality Date    CHOLECYSTECTOMY         Social History:    Social History     Tobacco Use    Smoking status: Never Smoker    Smokeless tobacco: Never Used   Substance Use Topics    Alcohol use: Yes     Comment: occ                                Counseling given: Not Answered      Vital Signs (Current):   Vitals:    09/20/20 1900 09/20/20 2200 09/21/20 0300 09/21/20 0715   BP: 134/87 (!) 135/92 137/88 (!) 152/94   Pulse: 57 70 67 70   Resp: 16 16 16    Temp: 98 °F (36.7 °C) 98.4 °F (36.9 °C) 98.3 °F (36.8 °C) 98.3 °F (36.8 °C)   TempSrc: Oral Oral Oral Oral   SpO2: 98% 99% 97%    Weight:       Height:                                                  BP Readings from Last 3 Encounters:   09/21/20 (!) 152/94   09/17/20 (!) 157/94       NPO Status: Time of last liquid consumption: 2300                        Time of last solid consumption: 1800                        Date of last liquid consumption: 09/20/20                        Date of last solid food consumption: 09/20/20    BMI:   Wt Readings from Last 3 Encounters:   09/18/20 227 lb 15.3 oz (103.4 kg) 09/17/20 220 lb (99.8 kg)     Body mass index is 37.93 kg/m². CBC:   Lab Results   Component Value Date    WBC 9.6 09/21/2020    RBC 5.22 09/21/2020    HGB 14.2 09/21/2020    HCT 43.8 09/21/2020    MCV 84.0 09/21/2020    RDW 14.4 09/21/2020     09/21/2020       CMP:   Lab Results   Component Value Date     09/21/2020    K 4.1 09/21/2020    K 3.8 09/17/2020     09/21/2020    CO2 23 09/21/2020    BUN 13 09/21/2020    CREATININE 0.7 09/21/2020    GFRAA >60 09/21/2020    LABGLOM >60 09/21/2020    GLUCOSE 102 09/21/2020    CALCIUM 9.0 09/21/2020       POC Tests: No results for input(s): POCGLU, POCNA, POCK, POCCL, POCBUN, POCHEMO, POCHCT in the last 72 hours.     Coags:   Lab Results   Component Value Date    PROTIME 12.6 09/17/2020    INR 1.09 09/17/2020       HCG (If Applicable):   Lab Results   Component Value Date    PREGTESTUR Negative 09/21/2020        ABGs: No results found for: PHART, PO2ART, RNC3NSF, MNS2IMO, BEART, [id number]     Type & Screen (If Applicable):  No results found for: LABABO, LABRH    Drug/Infectious Status (If Applicable):  No results found for: HIV, HEPCAB    COVID-19 Screening (If Applicable):   Lab Results   Component Value Date    COVID19 Not Detected 09/17/2020         Anesthesia Evaluation  Patient summary reviewed and Nursing notes reviewed no history of anesthetic complications:   Airway: Mallampati: I  TM distance: >3 FB   Neck ROM: full  Mouth opening: > = 3 FB Dental: normal exam         Pulmonary: breath sounds clear to auscultation      (-) not a current smoker (never)                           Cardiovascular:  Exercise tolerance: good (>4 METS),       (-) past MI    NYHA Classification: II  ECG reviewed  Rhythm: regular  Rate: normal           Beta Blocker:  Dose within 24 Hrs         Neuro/Psych:                ROS comment: Admit 3 days ago with ICH   Hypertension / req gtt to regulate     Right hand weakness and dyarthria resolved  GI/Hepatic/Renal: (-) GERD       Endo/Other:                     Abdominal:   (+) obese,         Vascular:   + PE (Hx of dvt/pe 5 yrs ago  w/u revealed protein c def   on xaralto  ). Anesthesia Plan      general     ASA 3       Induction: intravenous. arterial line  MIPS: Postoperative opioids intended and Prophylactic antiemetics administered. Anesthetic plan and risks discussed with patient. Plan discussed with CRNA.     Attending anesthesiologist reviewed and agrees with Pre Eval content              Maritza Spence DO   9/21/2020

## 2020-09-21 NOTE — CARE COORDINATION
Pt down for craniotomy. Pt very active prior to surgery. Anticipate no needs. Will wait to see if pt will have PT/OT recs.  Electronically signed by Erika Sarabia RN on 9/21/2020 at 2:37 PM

## 2020-09-22 ENCOUNTER — APPOINTMENT (OUTPATIENT)
Dept: MRI IMAGING | Age: 50
DRG: 023 | End: 2020-09-22
Attending: NEUROLOGICAL SURGERY
Payer: COMMERCIAL

## 2020-09-22 LAB
ANION GAP SERPL CALCULATED.3IONS-SCNC: 9 MMOL/L (ref 3–16)
BUN BLDV-MCNC: 11 MG/DL (ref 7–20)
CALCIUM SERPL-MCNC: 7.9 MG/DL (ref 8.3–10.6)
CHLORIDE BLD-SCNC: 107 MMOL/L (ref 99–110)
CO2: 22 MMOL/L (ref 21–32)
CREAT SERPL-MCNC: 0.6 MG/DL (ref 0.6–1.1)
GFR AFRICAN AMERICAN: >60
GFR NON-AFRICAN AMERICAN: >60
GLUCOSE BLD-MCNC: 121 MG/DL (ref 70–99)
HCT VFR BLD CALC: 39.4 % (ref 36–48)
HEMOGLOBIN: 12.8 G/DL (ref 12–16)
MCH RBC QN AUTO: 27.2 PG (ref 26–34)
MCHC RBC AUTO-ENTMCNC: 32.4 G/DL (ref 31–36)
MCV RBC AUTO: 83.9 FL (ref 80–100)
PDW BLD-RTO: 14.3 % (ref 12.4–15.4)
PLATELET # BLD: 142 K/UL (ref 135–450)
PMV BLD AUTO: 9.1 FL (ref 5–10.5)
POTASSIUM SERPL-SCNC: 3.8 MMOL/L (ref 3.5–5.1)
RBC # BLD: 4.69 M/UL (ref 4–5.2)
SODIUM BLD-SCNC: 138 MMOL/L (ref 136–145)
WBC # BLD: 17.1 K/UL (ref 4–11)

## 2020-09-22 PROCEDURE — 2580000003 HC RX 258: Performed by: NEUROLOGICAL SURGERY

## 2020-09-22 PROCEDURE — 97116 GAIT TRAINING THERAPY: CPT

## 2020-09-22 PROCEDURE — 92507 TX SP LANG VOICE COMM INDIV: CPT

## 2020-09-22 PROCEDURE — 6370000000 HC RX 637 (ALT 250 FOR IP): Performed by: NURSE PRACTITIONER

## 2020-09-22 PROCEDURE — 37799 UNLISTED PX VASCULAR SURGERY: CPT

## 2020-09-22 PROCEDURE — 97530 THERAPEUTIC ACTIVITIES: CPT

## 2020-09-22 PROCEDURE — 36415 COLL VENOUS BLD VENIPUNCTURE: CPT

## 2020-09-22 PROCEDURE — 6360000002 HC RX W HCPCS: Performed by: NURSE PRACTITIONER

## 2020-09-22 PROCEDURE — 6370000000 HC RX 637 (ALT 250 FOR IP): Performed by: NEUROLOGICAL SURGERY

## 2020-09-22 PROCEDURE — 92526 ORAL FUNCTION THERAPY: CPT

## 2020-09-22 PROCEDURE — 99222 1ST HOSP IP/OBS MODERATE 55: CPT | Performed by: INTERNAL MEDICINE

## 2020-09-22 PROCEDURE — 80048 BASIC METABOLIC PNL TOTAL CA: CPT

## 2020-09-22 PROCEDURE — 97166 OT EVAL MOD COMPLEX 45 MIN: CPT

## 2020-09-22 PROCEDURE — 2060000000 HC ICU INTERMEDIATE R&B

## 2020-09-22 PROCEDURE — 85027 COMPLETE CBC AUTOMATED: CPT

## 2020-09-22 PROCEDURE — 97162 PT EVAL MOD COMPLEX 30 MIN: CPT

## 2020-09-22 PROCEDURE — 97168 OT RE-EVAL EST PLAN CARE: CPT

## 2020-09-22 PROCEDURE — 97535 SELF CARE MNGMENT TRAINING: CPT

## 2020-09-22 PROCEDURE — 70551 MRI BRAIN STEM W/O DYE: CPT

## 2020-09-22 PROCEDURE — 93005 ELECTROCARDIOGRAM TRACING: CPT | Performed by: STUDENT IN AN ORGANIZED HEALTH CARE EDUCATION/TRAINING PROGRAM

## 2020-09-22 PROCEDURE — 1200000000 HC SEMI PRIVATE

## 2020-09-22 RX ORDER — HEPARIN SODIUM 5000 [USP'U]/ML
5000 INJECTION, SOLUTION INTRAVENOUS; SUBCUTANEOUS EVERY 8 HOURS SCHEDULED
Status: DISCONTINUED | OUTPATIENT
Start: 2020-09-22 | End: 2020-09-24 | Stop reason: HOSPADM

## 2020-09-22 RX ORDER — SENNA AND DOCUSATE SODIUM 50; 8.6 MG/1; MG/1
2 TABLET, FILM COATED ORAL DAILY
Status: DISCONTINUED | OUTPATIENT
Start: 2020-09-22 | End: 2020-09-24 | Stop reason: HOSPADM

## 2020-09-22 RX ADMIN — HEPARIN SODIUM 5000 UNITS: 5000 INJECTION INTRAVENOUS; SUBCUTANEOUS at 15:48

## 2020-09-22 RX ADMIN — LEVETIRACETAM 500 MG: 500 TABLET, FILM COATED ORAL at 08:32

## 2020-09-22 RX ADMIN — ATORVASTATIN CALCIUM 80 MG: 80 TABLET, FILM COATED ORAL at 20:34

## 2020-09-22 RX ADMIN — LEVETIRACETAM 500 MG: 500 TABLET, FILM COATED ORAL at 20:34

## 2020-09-22 RX ADMIN — Medication 10 ML: at 08:38

## 2020-09-22 RX ADMIN — Medication 10 ML: at 20:34

## 2020-09-22 RX ADMIN — ACETAMINOPHEN 650 MG: 325 TABLET ORAL at 17:25

## 2020-09-22 RX ADMIN — DOCUSATE SODIUM 50 MG AND SENNOSIDES 8.6 MG 2 TABLET: 8.6; 5 TABLET, FILM COATED ORAL at 13:53

## 2020-09-22 RX ADMIN — OXYCODONE 5 MG: 5 TABLET ORAL at 15:58

## 2020-09-22 RX ADMIN — OXYCODONE 10 MG: 5 TABLET ORAL at 20:34

## 2020-09-22 RX ADMIN — OXYCODONE HYDROCHLORIDE AND ACETAMINOPHEN 1 TABLET: 5; 325 TABLET ORAL at 06:18

## 2020-09-22 RX ADMIN — ACETAMINOPHEN 650 MG: 325 TABLET ORAL at 06:18

## 2020-09-22 RX ADMIN — METOPROLOL SUCCINATE 25 MG: 50 TABLET, EXTENDED RELEASE ORAL at 08:31

## 2020-09-22 RX ADMIN — HEPARIN SODIUM 5000 UNITS: 5000 INJECTION INTRAVENOUS; SUBCUTANEOUS at 21:35

## 2020-09-22 ASSESSMENT — PAIN DESCRIPTION - PAIN TYPE
TYPE: SURGICAL PAIN
TYPE: SURGICAL PAIN
TYPE: ACUTE PAIN;SURGICAL PAIN

## 2020-09-22 ASSESSMENT — PAIN SCALES - GENERAL
PAINLEVEL_OUTOF10: 0
PAINLEVEL_OUTOF10: 7
PAINLEVEL_OUTOF10: 4
PAINLEVEL_OUTOF10: 0
PAINLEVEL_OUTOF10: 5
PAINLEVEL_OUTOF10: 0
PAINLEVEL_OUTOF10: 6
PAINLEVEL_OUTOF10: 6
PAINLEVEL_OUTOF10: 0
PAINLEVEL_OUTOF10: 5
PAINLEVEL_OUTOF10: 7
PAINLEVEL_OUTOF10: 0
PAINLEVEL_OUTOF10: 0

## 2020-09-22 ASSESSMENT — PAIN DESCRIPTION - LOCATION
LOCATION: HEAD

## 2020-09-22 ASSESSMENT — PAIN DESCRIPTION - DIRECTION: RADIATING_TOWARDS: N.A

## 2020-09-22 ASSESSMENT — PAIN DESCRIPTION - FREQUENCY
FREQUENCY: INTERMITTENT

## 2020-09-22 ASSESSMENT — PAIN DESCRIPTION - DESCRIPTORS
DESCRIPTORS: ACHING;HEADACHE
DESCRIPTORS: ACHING
DESCRIPTORS: ACHING;SORE

## 2020-09-22 ASSESSMENT — ENCOUNTER SYMPTOMS
ABDOMINAL PAIN: 0
DIARRHEA: 0
NAUSEA: 0
SHORTNESS OF BREATH: 0
VOMITING: 0

## 2020-09-22 ASSESSMENT — PAIN DESCRIPTION - ONSET
ONSET: ON-GOING

## 2020-09-22 ASSESSMENT — PAIN - FUNCTIONAL ASSESSMENT
PAIN_FUNCTIONAL_ASSESSMENT: ACTIVITIES ARE NOT PREVENTED

## 2020-09-22 ASSESSMENT — PAIN DESCRIPTION - ORIENTATION
ORIENTATION: LEFT

## 2020-09-22 ASSESSMENT — PAIN DESCRIPTION - PROGRESSION
CLINICAL_PROGRESSION: NOT CHANGED

## 2020-09-22 NOTE — PROGRESS NOTES
NEUROSURGERY POST-OP PROGRESS NOTE    Patient Name: Parminder Robertson YOB: 1970   Sex: Female Age: 48 yrs     Medical Record Number: [id number] Acct Number: [de-identified]   Room Number: 0211/8757-15 Hospital Day: Hospital Day: 6     Interval History:  Post-operative Day# 1 s/p LEFT FRONTAL CRANIOTOMY FOR RESECTION OF CAVERNOMA    Subjective: Patient has headache 4/10, tearful about incision and haircut, no other specific complaints. Objective:    VITAL SIGNS   /70   Pulse 80   Temp 99 °F (37.2 °C) (Oral)   Resp 20   Ht 5' 5\" (1.651 m)   Wt 227 lb 15.3 oz (103.4 kg)   SpO2 99%   BMI 37.93 kg/m²    Height Height: 5' 5\" (165.1 cm)   Weight Weight: 227 lb 15.3 oz (103.4 kg)        Allergies No Known Allergies   NPO Status DIET CARB CONTROL; Carb Control: 4 carb choices (60 gms)/meal   Isolation No active isolations     LABS   Basic Metabolic Profile Recent Labs     09/20/20  0532 09/21/20  0527 09/22/20  0606    138 138    103 107   CO2 21 23 22   BUN 14 13 11   CREATININE 0.8 0.7 0.6   GLUCOSE 99 102* 121*      Complete Blood Count Recent Labs     09/20/20  0532 09/21/20  0527 09/22/20  0606   WBC 8.3 9.6 17.1*   RBC 5.28* 5.22* 4.69      Coagulation Studies No results for input(s): PTT, INR in the last 72 hours.     Invalid input(s): PLATELETS, PROA, PT, PTTA     MEDICATIONS   Inpatient Medications     sodium chloride flush, 10 mL, Intravenous, 2 times per day    levETIRAcetam, 500 mg, Oral, BID    metoprolol succinate, 25 mg, Oral, Daily    atorvastatin, 80 mg, Oral, Nightly   Infusions    sodium chloride 100 mL/hr at 09/21/20 1853      Antibiotics   Recent Abx Admin                   ceFAZolin (ANCEF) intramuscular injection 1 g (mg) 2,000 mg Given 09/21/20 1137                 Neurologic Exam:    Mental status: Patient awake, alert, oriented X 4  Cranial Nerves:  II: Visual acuity not tested, visual fields intact, denies new visual changes / diplopia  III, IV, VI: PERRL, 3 mm bilaterally, EOMI, no nystagmus noted  V: Facial sensation intact bilaterally to touch  VII: Face symmetric  VIII: Hearing intact bilaterally to spoken voice  IX: Palate movement equal bilaterally  XI: Shoulder shrug equal bilaterally  XII: Tongue midline    Musculoskeletal:   Gait: Not tested   Tone: normal  Sensory: intact   Motor strength:    Right  Left    Right  Left    Deltoid  5 5  Hip Flex  5 5   Biceps  5 5  Knee Extensors  5 5   Triceps  5 5  Knee Flexors  5 5   Wrist Ext  5 5  Ankle Dorsiflex. 5 5   Wrist Flex  5 5  Ankle Plantarflex. 5 5   Handgrip  5 5  Ext Edmundo Longus  5 5   Thumb Ext  5 5         Incision: staples, open to air, clean/dry/intact       Respiratory:  Unlabored respiratory pattern    Abdomen:   Soft, ND   Last BM 9/19/2020    Cardiovascular:  Warm, well perfused    Assessment   48year old female POD 1 s/p LEFT FRONTAL CRANIOTOMY FOR RESECTION OF CAVERNOMA    Plan:  1. Neurologic exam frequency: q 4 hours  2. Mobility: PT/OT, activity as tolerated, appreciate recs   3. Seizure Prophylaxis: Keppra 500mg BID  4. Diet: advance as tolerates   5. Baltazar: remove today   6. DVT Prophylaxis: SCDs and heparin SQ  7. Bowel Regimen: senokot-S  8. Pain control: prn tylenol, roxicodone   9. Incisional Care: cleanse daily with soap and water, pat dry and paint with CHG swab  10. Post op MRI today  11. Appreciate medical team assistance with management  12. Dispo Planning: transfer to [address] [place] [address]    Patient was discussed with Dr. Alexei Cazares who agrees with above assessment and plan.      Electronically signed   9/22/2020 8:16 AM   [phone]

## 2020-09-22 NOTE — PROGRESS NOTES
Occupational Therapy   Occupational Therapy Re-Assessment/Treatment   Date: 2020   Patient Name: Christopher Vidal  MRN: [id number]     : 1970    Date of Service: 2020    Discharge Recommendations:  2-3 sessions per week, 24 hour supervision or assist, Home with Home health OT     Christopher Vidal scored a 19/24 on the AM-PAC ADL Inpatient form. Current research shows that an AM-PAC score of 18 or greater is typically associated with a discharge to the patient's home setting. Based on the patient's AM-PAC score, and their current ADL deficits, it is recommended that the patient have 2-3 sessions per week of Occupational Therapy at d/c to increase the patient's independence. At this time, this patient demonstrates the endurance and safety to discharge home with  (home services) and a follow up treatment frequency of 2-3x/wk. Please see assessment section for further patient specific details. If patient discharges prior to next session this note will serve as a discharge summary. Please see below for the latest assessment towards goals. HOME HEALTH CARE: LEVEL 3 SAFETY     - Initial home health evaluation to occur within 24-48 hours, in patient home   - Therapy evaluations in home within 24-48 hours of discharge; including DME and home safety   - Frontload therapy 5 days, then 3x a week   - Therapy to evaluate if patient has [address] needs for personal care   -  evaluation within 24-48 hours, includes evaluation of resources and insurance to determine AL, [place], LTC, and Medicaid options     Assessment   Assessment: Patient is a 53yr old female s/p L frontal craniotomy. Patient reports PLOF as independent with ADLs. Patient currently functioning slightly below baseline due to decreased cognition. Patient demonstrating increased difficulty to sequencing basic hand hygiene tasks.  Impaired safety awareness noted with impulsivity and fair insight to deficits resulting increased risk Walk-in shower  Bathroom Toilet: Standard(leverage beside toilet)  Bathroom Accessibility: Accessible  Home Equipment: (none)  ADL Assistance: Independent  Homemaking Assistance: Independent  Ambulation Assistance: Independent  Transfer Assistance: Independent  Active : Yes  Type of occupation: working from [facility] [address]: [employer]; goes to the gym  IADL Comments: does own grocery shopping  Additional Comments: denies falls       Objective   Vision: Within Functional Limits(patient denies any vision change s/p surgery)  Hearing: Within functional limits    Orientation  Overall Orientation Status: Within Functional Limits     Balance  Sitting Balance: Stand by assistance  Standing Balance: Contact guard assistance  Functional Mobility  Functional - Mobility Device: No device  Activity: To/from bathroom  Assist Level: Contact guard assistance  Functional Mobility Comments: patient very impulsive, decreased awareness and insight to safety  Toilet Transfers  Toilet - Technique: Ambulating  Equipment Used: Standard toilet  Toilet Transfer: Stand by assistance  ADL  Grooming: Increased time to complete;Verbal cueing;Contact guard assistance(patient noted to have increased difficulty sequencing hand washing; patient using paper towel prior to washing soap off)  LE Dressing: Contact guard assistance; Increased time to complete;Verbal cueing(pt with decreased safety awareness with attempted to bed down with minimal clearance of buttock on bed. Patient donning underwear)  Toileting: Contact guard assistance  Tone RUE  RUE Tone: Normotonic  Tone LUE  LUE Tone: Normotonic     Bed mobility  Supine to Sit: Supervision(impulsive and verbal cues required for safety)  Transfers  Sit to stand: Stand by assistance  Stand to sit: Stand by assistance     Cognition  Overall Cognitive Status: Exceptions  Arousal/Alertness: Appropriate responses to stimuli;Delayed responses to stimuli  Following Commands:  Follows one step commands with repetition; Follows one step commands with increased time  Attention Span: Difficulty attending to directions  Memory: Decreased short term memory  Safety Judgement: Decreased awareness of need for assistance;Decreased awareness of need for safety  Problem Solving: Assistance required to correct errors made;Assistance required to generate solutions;Assistance required to implement solutions  Insights: Decreased awareness of deficits  Initiation: Does not require cues  Sequencing: Requires cues for some        Sensation  Overall Sensation Status: WFL(denies any numbness of tingling)        LUE AROM (degrees)  LUE AROM : WFL  Left Hand AROM (degrees)  Left Hand AROM: WFL  RUE AROM (degrees)  RUE AROM : WFL  Right Hand AROM (degrees)  Right Hand AROM: WFL  LUE Strength  Gross LUE Strength: WFL  RUE Strength  Gross RUE Strength: WFL                   Plan   Plan  Times per week: 5-7x  Current Treatment Recommendations: Patient/Caregiver Education & Training, Balance Training, Functional Mobility Training, Safety Education & Training, Self-Care / ADL, Home Management Training           AM-PAC Score        AM-PAC Inpatient Daily Activity Raw Score: 19 (09/22/20 1526)  AM-PAC Inpatient ADL T-Scale Score : 40.22 (09/22/20 1526)  ADL Inpatient CMS 0-100% Score: 42.8 (09/22/20 1526)  ADL Inpatient CMS G-Code Modifier : CK (09/22/20 1526)    Goals  Short term goals  Time Frame for Short term goals: by d/c  Short term goal 1: Complete LB ADLs with mod I  Short term goal 2: Complete toileting with mod I  Short term goal 3: Complete functional transfers with mod I  Short term goal 4: Pt will verbalize 3 home safety techniques to decrease the risk of falls upon d/c home.        Therapy Time   Individual Concurrent Group Co-treatment   Time In 1409         Time Out 1448         Minutes 39         Timed Code Treatment Minutes: 19 [facility], OTR/L

## 2020-09-22 NOTE — PROGRESS NOTES
Progress Note        Date:2020       Room:4526/4526-01  Patient Name:Merry Kim     YOB: 1970     Age:50 y.o. Dysarthria, numbness          Subjective   Interval History Status: s/p LEFT FRONTAL CRANIOTOMY FOR RESECTION OF CAVERNOMA on 20  Denies any HA, nausea, vomiting  No new complaints         Review of Systems   ROS as mentioned above. Medications   Scheduled Meds:    sennosides-docusate sodium  2 tablet Oral Daily    heparin (porcine)  5,000 Units Subcutaneous 3 times per day    sodium chloride flush  10 mL Intravenous 2 times per day    levETIRAcetam  500 mg Oral BID    metoprolol succinate  25 mg Oral Daily    atorvastatin  80 mg Oral Nightly     Continuous Infusions:     PRN Meds: acetaminophen, oxyCODONE **OR** oxyCODONE, sodium chloride flush, promethazine **OR** ondansetron    Past History    Past Medical History:   has a past medical history of Pulmonary embolism ([facility] [address] Social History:   reports that she has never smoked. She has never used smokeless tobacco. She reports current alcohol use. She reports current drug use. Drug: Marijuana. Family History: History reviewed. No pertinent family history. Physical Examination      Vitals:  /89   Pulse 63   Temp 98 °F (36.7 °C) (Oral)   Resp 17   Ht 5' 5\" (1.651 m)   Wt 227 lb 15.3 oz (103.4 kg)   SpO2 99%   BMI 37.93 kg/m²   Temp (24hrs), Av.4 °F (36.9 °C), Min:97.4 °F (36.3 °C), Max:99 °F (37.2 °C)      I/O (24Hr): Intake/Output Summary (Last 24 hours) at 2020 1517  Last data filed at 2020 0600  Gross per 24 hour   Intake 1150 ml   Output 905 ml   Net 245 ml       Physical Exam  Constitutional:       Appearance: Normal appearance. Neck:      Musculoskeletal: Normal range of motion and neck supple. Cardiovascular:      Rate and Rhythm: Normal rate and regular rhythm. Pulses: Normal pulses. Heart sounds: Normal heart sounds.    Pulmonary:      Effort: Pulmonary effort is normal.      Breath sounds: Normal breath sounds. Abdominal:      General: Abdomen is flat. Palpations: Abdomen is soft. Musculoskeletal: Normal range of motion. General: No swelling or tenderness. Skin:     General: Skin is warm and dry. Capillary Refill: Capillary refill takes less than 2 seconds. Neurological:      General: No focal deficit present. Mental Status: She is alert and oriented to person, place, and time. Psychiatric:         Mood and Affect: Mood normal.         Behavior: Behavior normal.           Labs/Imaging/Diagnostics   Labs:  CBC:   Recent Labs     09/20/20  0532 09/21/20  0527 09/22/20  0606   WBC 8.3 9.6 17.1*   HGB 14.6 14.2 12.8   HCT 44.6 43.8 39.4   MCV 84.5 84.0 83.9    158 142     BMP:   Recent Labs     09/20/20  0532 09/21/20  0527 09/22/20  0606    138 138   K 4.1 4.1 3.8    103 107   CO2 21 23 22   BUN 14 13 11   CREATININE 0.8 0.7 0.6     Mag: No results found for: MG  LIVER PROFILE: No results for input(s): AST, ALT, LIPASE, BILIDIR, BILITOT, ALKPHOS in the last 72 hours. Invalid input(s): AMYLASE,  ALB  PT/INR:   No results for input(s): PROTIME, INR in the last 72 hours. APTT: No results for input(s): APTT in the last 72 hours. BNP:  No results for input(s): BNP in the last 72 hours. CARDIAC ENZYMES:   No results for input(s): CKTOTAL, CKMB, TROPONINI in the last 72 hours. Imaging Last 24 Hours:  MRI Brain wo Contrast   Final Result      Postoperative changes of left frontal craniotomy with resection of associated hemorrhagic lesion compatible cavernoma. Minimal residual popcorn-like T2 signal in the left frontal lobe with hemosiderin staining may be postoperative. A small component    residual cavernoma be difficult to exclude. Continued follow-up is suggested. Postoperative diffusion restriction compatible with ischemic change about the surgical tract and left parasagittal frontal lobe.       Trace left frontal subdural hematoma. MRI BRAIN W CONTRAST   Final Result      Stable small focus of hemorrhage within the left frontal lobe likely representing a hemorrhagic cavernoma. No visible associated developmental venous anomaly. MRI BRAIN W WO CONTRAST   Final Result      1. Small left frontal lobe cavernous malformation, mildly hemorrhagic. CT head without contrast   Final Result       No significant interval change in an 8 mm ill-defined hyperdense lesion    within the left frontal lobe, at the gray matter white matter junction. Differential diagnosis includes intraparenchymal hemorrhagic contusion    secondary to trauma, hemorrhagic metastasis, or cavernous malformation. Assessment      Active Problems:    Brain bleed ([address]  Resolved Problems:    * No resolved hospital problems. *       Plan:        ICH, hemorrhagic cavernoma, s/p resection on 9/22  Was on xarelto , Xarelto on hold  NS following   No focal deficits noted. S/p Kcentra. Keppra per NS. Monitor BP off Nicardipine gtt     Hx of thrombophilia  Has seen  Hematology in past.  ?protein s def  Consult Hematology for anticoagulation recs moving forward.   Appreciate recommendations    Disposition:- inpatient 1-2 days     Pancho Bees

## 2020-09-22 NOTE — PROGRESS NOTES
Pt is in bed resting. She is A+Ox4. No neuro deficits noted. Pt currently reports no nausea or pain. Heart rate remains irregular with ST elevation. Residents notified and STAT EKG ordered. Other VSS. No new orders at this time. MRI questionnaire complete and in patient chart. Safety precautions in place. Will continue to monitor.

## 2020-09-22 NOTE — PROGRESS NOTES
ICU Progress Note    Admit Date: 9/17/2020  Day: 5  Vent Day: None  IV Access:Peripheral  IV Fluids:None  Vasopressors:None                Antibiotics: None  Diet: DIET CARB CONTROL; Carb Control: 4 carb choices (60 gms)/meal    CC: Intracranial hemorrhage, left frontal lobe    Interval history: Overnight patient's blood pressure in the upper 140s-150s. Goal systolic less than 461. Patient seen this morning at bedside. AAOx3. No cranial nerve deficits. Denies dizziness, headache, photophobia. Slurred speech noted. Denies chest pain, shortness of breath, NVD. HPI: 48 y/F PMH  of DVT and PE (on Xarelto) presented to [facility] with chief complain of speech difficulty, numbness, and tingling of the right hand. Pts CT scan was significant for a 9cm focus of acute intracranial hemorrhage in the left frontal lobe. Pt was started on keppra, Kcentra and Nicardipine gtt for BP control. 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 Pt underwent cavernoma resection on 09/21 and was transferred to the ICU for post-OP neuro examinations and concern for bradycardia (HR 54). In the ICU, pts HR was noted to be 86. On examination pt did not exhibit an new neurologic deficit s/p cavernoma resection. Pt denies HA, CP, dizzyness, fever, SOB.      Medications:     Scheduled Meds:   sodium chloride flush  10 mL Intravenous 2 times per day    levETIRAcetam  500 mg Oral BID    metoprolol succinate  25 mg Oral Daily    atorvastatin  80 mg Oral Nightly     Continuous Infusions:   sodium chloride 100 mL/hr at 09/21/20 1853     PRN Meds:sodium chloride flush, acetaminophen, oxyCODONE **OR** oxyCODONE, sodium chloride flush, promethazine **OR** ondansetron, oxyCODONE-acetaminophen    Objective:   Vitals:   T-max:  Patient Vitals for the past 8 hrs:   BP Temp Temp src Pulse Resp SpO2   09/22/20 0800 105/63 98.7 °F (37.1 °C) Oral 70 20 99 %   09/22/20 0600 -- -- -- 80 20 99 %   09/22/20 0500 135/70 -- -- input(s): CHOL, HDL in the last 72 hours. Invalid input(s): LDLCALCU, TRIGLYCERIDE  ABGs:  No results for input(s): PHART, JPQ6PHD, PO2ART, FQE1WRG, BEART, THGBART, B8QPRTEN, VDI1ZIM in the last 72 hours. INR: No results for input(s): INR in the last 72 hours. Lactate: No results for input(s): LACTATE in the last 72 hours. Cultures:  -----------------------------------------------------------------  RAD:   MRI BRAIN W CONTRAST   Final Result      Stable small focus of hemorrhage within the left frontal lobe likely representing a hemorrhagic cavernoma. No visible associated developmental venous anomaly. MRI BRAIN W WO CONTRAST   Final Result      1. Small left frontal lobe cavernous malformation, mildly hemorrhagic. CT head without contrast   Final Result       No significant interval change in an 8 mm ill-defined hyperdense lesion    within the left frontal lobe, at the gray matter white matter junction. Differential diagnosis includes intraparenchymal hemorrhagic contusion    secondary to trauma, hemorrhagic metastasis, or cavernous malformation. MRI Brain wo Contrast    (Results Pending)         Assessment/Plan:     Spontaneous intracranial hemorrhage from cavernoma s/p resection  CT head: 9 mm focus of acute intracranial hemorrhage in the left frontal lobe. . MRI: left frontal cavernoma as the source of hemorrhage. MRI brain wo contrast for post OP changes.  - Keppra 500 mg   - Neuro check q 1 hr  - Lipitor 80 mg  - Goal SBP < 160  - Toprol 25 mg   - Pain mx: roxicodone, percocept   - Elevate head of the bed  - Hold antiplatelet and anti coaugulation  - PT/OT    Code Status: full  FEN: carb control   PPX: lovenox  DISPO: icu    Al LucioDO, PGY-1  09/22/20  9:51 AM    This patient has been staffed and discussed Larry Tavares MD.     Patient was seen, examined and discussed with Dr. Scarlet Burch.  I agree with the history of present illness, past medical/surgical histories, family history, social history, medication list and allergies as listed. The review of systems is as noted above. My physical exam confirms the findings listed   Chart was reviewed including labs and medical records confirm the findings noted      There is no headache, nausea or blurring of vision. OK to transfer to the floor  Agree on the A/P noted above.

## 2020-09-22 NOTE — PROGRESS NOTES
Speech Language Pathology  Facility/Department: [facility] ICU  Daily Treatment Note  Discharge   NAME: Amber Flores  : 1970  MRN: [id number]    Date of Eval: 2020  Evaluating Therapist: Jamel Kothari    Patient Diagnosis(es): has Brain bleed [facility]) on their problem list.       RECENT RESULTS  CT OF HEAD/MRI: 20  Impression         1.  Small left frontal lobe cavernous malformation, mildly hemorrhagic.       MRI 20       Stable small focus of hemorrhage within the left frontal lobe likely representing a hemorrhagic cavernoma. No visible associated developmental venous anomaly         Speech Eval impression 20  Pt w/ functional speech/lang/cog at this time; noted to have mild dysfluencies w/ repetition at beginning of sentences (inconsistent), however, pt independent w/ use of strategies including decreased rate of speech. Educated regarding possible post-surgical difficulties, and expressed understanding. Will continue to monitor post-operatively to assess any changes in cognition and best address therapeutically    Chart reviewed. Pain: denied pain     Medical diagnosis:brain bleed  Treatment diagnosis:cognitive linguistic impairment     Treatment:  Pt seen to address the following goals:  Goal 1: Pt will tolerate ongoing cognitive-linguistic testing as warranted   - goal met- pt seen this date for pos-operative follow up. Pt had no difficulty following commands or answering questions. Pt was able to proved 3 reasons for a given situation and describe steps to complete a task without difficulty. Speech was clear and fluent with no instances of word finding difficulty or repetitions. Education:  Role of SLP- pt stated comprehension       Plan:  Pt discharged from Speech Therapy services. Pt met all goals for therapy. DC recommendations:no further follow up indicated   D/W nursingHuma   Needs met prior to leaving room, call button in reach.   Treatment time:12     [facility] Elizabeth Orr, Hussein Lyon  Speech-Language Pathologist  Pager [phone]    If patient is discharged prior to next treatment, this note will serve as the discharge summary

## 2020-09-22 NOTE — PROGRESS NOTES
Physical Therapy    Facility/Department: [address] [place] [address] N ICU  Initial Assessment and Treatment    NAME: Allen Olivas  : 1970  MRN: [id number]    Date of Service: 2020    Discharge Recommendations:  Allen Olivas scored a 19/24 on the AM-PAC short mobility form. Current research shows that an AM-PAC score of 18 or greater is typically associated with a discharge to the patient's home setting. Based on the patient's AM-PAC score and their current functional mobility deficits, it is recommended that the patient have 2-3 sessions per week of Physical Therapy at d/c to increase the patient's independence. At this time, this patient demonstrates the endurance and safety to discharge home. Please see assessment section for further patient specific details. PT Equipment Recommendations  Equipment Needed: (Defer)    Assessment   Assessment: Pt re-evalated for d/c needs s/p L frontral crani for resection of cavernoma. Pt demonstrates decreased transfers and gait from baseline and initial PT eval . Pt is impulsive with poor safety awareness. Gait is slightly unsteady. Pt with flat affect throughout session. Pt's sister is in from out of town and plans to stay with her at d/c. Will continue to follow for ongoing PT in acute setting. Treatment Diagnosis: Decreased gait secondary to brain bleed. Decision Making: Medium Complexity  Patient Education: Role of PT. Pt verbalized partial understanding and would benefit from ongoing education. REQUIRES PT FOLLOW UP: Yes         Restrictions  Up with assist     Vision/Hearing  Vision: Within Functional Limits  Hearing: Within functional limits       Subjective  Chart Reviewed: Yes  Additional Pertinent Hx: Pt to [facility] ED  with stroke like symptoms. Head CT: (+) Left frontal lobe ICH. CTA head/neck: no stenosis, cerebral aneurysm or AVM. Transferred to ICU at [facility] for Neurosurgery. Pt to OR  s/p s/p L Frontal Crani for resection of cavernoma.   PMH:  None room)      AM-PAC Score  AM-PAC Inpatient Mobility Raw Score : 19 (09/22/20 1502)  AM-PAC Inpatient T-Scale Score : 45.44 (09/22/20 1502)  Mobility Inpatient CMS 0-100% Score: 41.77 (09/22/20 1502)  Mobility Inpatient CMS G-Code Modifier : CK (09/22/20 1502)      Goals  Short term goals  Time Frame for Short term goals: Discharge  Short term goal 1: supine <> sit independent  Short term goal 2: sit <> stand modified independent  Short term goal 3: ambulate 150ft with/without assistive device supervision  Short term goal 4: ambulate up/down flight of steps with rails CGA  Patient Goals   Patient goals : Return home       Therapy Time   Individual Concurrent Group Co-treatment   Time In 1661         Time Out 1433         Minutes 38         Timed Code Treatment Minutes:  25  Total Treatment Minutes:  [facility] 21, PT

## 2020-09-22 NOTE — CARE COORDINATION
Patient is from home alone. Her sister will be staying with her at d/c. She has a walk-in shower and we discussed a possible need for a shower chair. I made a referral with Wilbert from [address] [place] [address] and he has been up to discuss this. Patient has also agreed to home care. [facility] may be in network. Ayde from [facility] was checking to make sure they could accept. Patient does have 3 flights of steps to get into her apt and it would be beneficial for her to attempt the steps while here prior to going home.      Electronically signed by Sami Peña RN on 9/22/2020 at 4:50 PM  [phone]

## 2020-09-22 NOTE — CONSULTS
ICU Consult Note    Admit Date: 9/17/2020  Hospital Day: 5    ICU DAY  Code:Full Code  PCP: Jahaira Mclaughlin            SUBJECTIVE:   Interval Hx: 48 y/F PMH  of DVT and PE (on Xarelto) presented to [facility] with chief complain of speech difficulty, numbness, and tingling of the right hand. Pts CT scan was significant for a 9cm focus of acute intracranial hemorrhage in the left frontal lobe. Pt was started on keppra, Kcentra and Nicardipine gtt for BP control. 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 Pt underwent cavernoma resection on 09/21 and was transferred to the ICU for post-OP neuro examinations and concern for bradycardia (HR 54). In the ICU, pts HR was noted to be 86. On examination pt did not exhibit an new neurologic deficit s/p cavernoma resection. Pt denies HA, CP, dizzyness, fever, SOB. MEDICATIONS:   Scheduled Meds:   sodium chloride flush  10 mL Intravenous 2 times per day    levETIRAcetam  500 mg Oral BID    metoprolol succinate  25 mg Oral Daily    atorvastatin  80 mg Oral Nightly      Continuous Infusions:   sodium chloride 100 mL/hr at 09/21/20 1853     PRN Meds:sodium chloride flush, acetaminophen, oxyCODONE **OR** oxyCODONE, sodium chloride flush, promethazine **OR** ondansetron, oxyCODONE-acetaminophen  Allergies: No Known Allergies    PHYSICAL EXAM:       Vitals: /71   Pulse 86   Temp 98 °F (36.7 °C) (Oral)   Resp 14   Ht 5' 5\" (1.651 m)   Wt 227 lb 15.3 oz (103.4 kg)   SpO2 (!) 66%   BMI 37.93 kg/m²   I/O:      Intake/Output Summary (Last 24 hours) at 9/21/2020 2321  Last data filed at 9/21/2020 1502  Gross per 24 hour   Intake 1570 ml   Output 205 ml   Net 1365 ml     No intake/output data recorded. I/O last 3 completed shifts:   In: 36 [P.O.:40; I.V.:1530]  Out: 205 [Urine:155; Blood:50]    Physical Examination:   · General appearance: alert, appears stated age and cooperative  · Skin: Skin color, texture, turgor normal. · HEENT: PERRLA: Normocephalic, no lesions, without obvious abnormality. · Neck: no adenopathy, no carotid bruit, no JVD, supple, symmetrical, trachea midline and thyroid not enlarged, symmetric, no tenderness/mass/nodules  · Lungs: clear to auscultation bilaterally  · Heart: regular rate and rhythm, S1, S2 normal, no murmur, click, rub or gallop  · Abdomen: soft, non-tender; bowel sounds normal; no masses,  no organomegaly  · Extremities: extremities normal, atraumatic, no cyanosis or edema  · Lymphatic: No significant lymph node enlargement papable  · Neurologic: CN II-XII grossly intact. Mental status: Alert, oriented, thought content appropriate      DATA:       Labs:  CBC:   Recent Labs     09/19/20  0508 09/20/20  0532 09/21/20  0527   WBC 7.1 8.3 9.6   HGB 14.3 14.6 14.2   HCT 44.3 44.6 43.8    156 158       BMP:   Recent Labs     09/19/20  0508 09/20/20  0532 09/21/20  0527    137 138   K 4.2 4.1 4.1    104 103   CO2 21 21 23   BUN 12 14 13   CREATININE 0.8 0.8 0.7   GLUCOSE 90 99 102*     ABGs: No results for input(s): PHART, JIX6DDT, PO2ART in the last 72 hours. Rad:   EKG:     ASSESSMENT AND PLAN:   Denver Garfinkel is a 48 y.o. female, who was admitted for intracranial hemorrhage s/p  cavernoma resection. Spontaneous intracranial hemorrhage from cavernoma s/p resection:   - CT head: 9 mm focus of acute intracranial hemorrhage in the left frontal lobe.   - MRI: left frontal cavernoma as the source of hemorrhage  - MRI brain wo contrast for post OP changes.  - Keppra 500 mg   - Neuro check q 1 hr  - Lipitor 80 mg  - Goal SBP < 160  - Toprol 25 mg   - Pain mx: roxicodone, percocept   - Elevate head of the bed  - Hold antiplatelet and anti coaugulation  - PT/OT     Code Status:Full Code  FEN: Regular Diet  PPX: Lovenox 40  DISPO: ICU for further management as above.    W/D/W/A  -----------------------------  Chevy Greenberg M.D.   PGY-1 Internal Medicine  Pager: [phone]  9/21/2020 11:21 PM

## 2020-09-23 LAB
ANION GAP SERPL CALCULATED.3IONS-SCNC: 10 MMOL/L (ref 3–16)
BUN BLDV-MCNC: 14 MG/DL (ref 7–20)
CALCIUM SERPL-MCNC: 8.2 MG/DL (ref 8.3–10.6)
CHLORIDE BLD-SCNC: 104 MMOL/L (ref 99–110)
CO2: 23 MMOL/L (ref 21–32)
CREAT SERPL-MCNC: 0.6 MG/DL (ref 0.6–1.1)
EKG ATRIAL RATE: 95 BPM
EKG DIAGNOSIS: NORMAL
EKG P AXIS: 61 DEGREES
EKG P-R INTERVAL: 162 MS
EKG Q-T INTERVAL: 340 MS
EKG QRS DURATION: 76 MS
EKG QTC CALCULATION (BAZETT): 427 MS
EKG R AXIS: 14 DEGREES
EKG T AXIS: 38 DEGREES
EKG VENTRICULAR RATE: 95 BPM
GFR AFRICAN AMERICAN: >60
GFR NON-AFRICAN AMERICAN: >60
GLUCOSE BLD-MCNC: 116 MG/DL (ref 70–99)
HCT VFR BLD CALC: 42.6 % (ref 36–48)
HEMOGLOBIN: 14 G/DL (ref 12–16)
MCH RBC QN AUTO: 27.6 PG (ref 26–34)
MCHC RBC AUTO-ENTMCNC: 32.9 G/DL (ref 31–36)
MCV RBC AUTO: 84 FL (ref 80–100)
PDW BLD-RTO: 13.9 % (ref 12.4–15.4)
PLATELET # BLD: 143 K/UL (ref 135–450)
PMV BLD AUTO: 9 FL (ref 5–10.5)
POTASSIUM SERPL-SCNC: 4.1 MMOL/L (ref 3.5–5.1)
RBC # BLD: 5.06 M/UL (ref 4–5.2)
REASON FOR REJECTION: NORMAL
REJECTED TEST: NORMAL
SODIUM BLD-SCNC: 137 MMOL/L (ref 136–145)
WBC # BLD: 14.6 K/UL (ref 4–11)

## 2020-09-23 PROCEDURE — 80048 BASIC METABOLIC PNL TOTAL CA: CPT

## 2020-09-23 PROCEDURE — 6370000000 HC RX 637 (ALT 250 FOR IP): Performed by: NURSE PRACTITIONER

## 2020-09-23 PROCEDURE — 97535 SELF CARE MNGMENT TRAINING: CPT

## 2020-09-23 PROCEDURE — 36415 COLL VENOUS BLD VENIPUNCTURE: CPT

## 2020-09-23 PROCEDURE — 2060000000 HC ICU INTERMEDIATE R&B

## 2020-09-23 PROCEDURE — 85027 COMPLETE CBC AUTOMATED: CPT

## 2020-09-23 PROCEDURE — 93010 ELECTROCARDIOGRAM REPORT: CPT | Performed by: INTERNAL MEDICINE

## 2020-09-23 PROCEDURE — 97530 THERAPEUTIC ACTIVITIES: CPT

## 2020-09-23 PROCEDURE — 6370000000 HC RX 637 (ALT 250 FOR IP): Performed by: NEUROLOGICAL SURGERY

## 2020-09-23 PROCEDURE — 97116 GAIT TRAINING THERAPY: CPT

## 2020-09-23 PROCEDURE — 6360000002 HC RX W HCPCS: Performed by: NURSE PRACTITIONER

## 2020-09-23 RX ORDER — HYDRALAZINE HYDROCHLORIDE 20 MG/ML
10 INJECTION INTRAMUSCULAR; INTRAVENOUS EVERY 8 HOURS PRN
Status: DISCONTINUED | OUTPATIENT
Start: 2020-09-23 | End: 2020-09-24 | Stop reason: HOSPADM

## 2020-09-23 RX ADMIN — ATORVASTATIN CALCIUM 80 MG: 80 TABLET, FILM COATED ORAL at 20:58

## 2020-09-23 RX ADMIN — METOPROLOL SUCCINATE 25 MG: 50 TABLET, EXTENDED RELEASE ORAL at 07:56

## 2020-09-23 RX ADMIN — HEPARIN SODIUM 5000 UNITS: 5000 INJECTION INTRAVENOUS; SUBCUTANEOUS at 13:27

## 2020-09-23 RX ADMIN — HEPARIN SODIUM 5000 UNITS: 5000 INJECTION INTRAVENOUS; SUBCUTANEOUS at 06:00

## 2020-09-23 RX ADMIN — LEVETIRACETAM 500 MG: 500 TABLET, FILM COATED ORAL at 07:55

## 2020-09-23 RX ADMIN — OXYCODONE 10 MG: 5 TABLET ORAL at 16:58

## 2020-09-23 RX ADMIN — OXYCODONE 10 MG: 5 TABLET ORAL at 11:29

## 2020-09-23 RX ADMIN — ACETAMINOPHEN 650 MG: 325 TABLET ORAL at 11:28

## 2020-09-23 RX ADMIN — DOCUSATE SODIUM 50 MG AND SENNOSIDES 8.6 MG 2 TABLET: 8.6; 5 TABLET, FILM COATED ORAL at 07:55

## 2020-09-23 RX ADMIN — OXYCODONE 10 MG: 5 TABLET ORAL at 05:47

## 2020-09-23 RX ADMIN — LEVETIRACETAM 500 MG: 500 TABLET, FILM COATED ORAL at 20:58

## 2020-09-23 RX ADMIN — HEPARIN SODIUM 5000 UNITS: 5000 INJECTION INTRAVENOUS; SUBCUTANEOUS at 22:20

## 2020-09-23 ASSESSMENT — PAIN DESCRIPTION - ORIENTATION
ORIENTATION: LEFT
ORIENTATION: LEFT

## 2020-09-23 ASSESSMENT — PAIN DESCRIPTION - PROGRESSION
CLINICAL_PROGRESSION: NOT CHANGED
CLINICAL_PROGRESSION: NOT CHANGED

## 2020-09-23 ASSESSMENT — PAIN DESCRIPTION - DESCRIPTORS: DESCRIPTORS: HEADACHE

## 2020-09-23 ASSESSMENT — PAIN DESCRIPTION - ONSET
ONSET: ON-GOING
ONSET: ON-GOING

## 2020-09-23 ASSESSMENT — PAIN DESCRIPTION - PAIN TYPE
TYPE: SURGICAL PAIN
TYPE: SURGICAL PAIN

## 2020-09-23 ASSESSMENT — PAIN SCALES - GENERAL
PAINLEVEL_OUTOF10: 7
PAINLEVEL_OUTOF10: 2

## 2020-09-23 ASSESSMENT — PAIN DESCRIPTION - LOCATION
LOCATION: HEAD
LOCATION: HEAD

## 2020-09-23 ASSESSMENT — PAIN DESCRIPTION - FREQUENCY
FREQUENCY: CONTINUOUS
FREQUENCY: INTERMITTENT

## 2020-09-23 NOTE — PROGRESS NOTES
NEUROSURGERY POST-OP PROGRESS NOTE    Patient Name: Gary Edwards YOB: 1970   Sex: Female Age: 48 yrs     Medical Record Number: [id number] Acct Number: [de-identified]   Room Number: 1734/9580-11 Hospital Day: Hospital Day: 7     Interval History:  Post-operative Day# 2 s/p LEFT FRONTAL CRANIOTOMY FOR RESECTION OF CAVERNOMA    Subjective: Patient has incisional pain 5/10 this morning, sitting up in bed, no other specific complaints. Flat affect. Objective:    VITAL SIGNS   /75   Pulse 80   Temp 99.1 °F (37.3 °C) (Oral)   Resp 16   Ht 5' 5\" (1.651 m)   Wt 227 lb 15.3 oz (103.4 kg)   SpO2 96%   BMI 37.93 kg/m²    Height Height: 5' 5\" (165.1 cm)   Weight Weight: 227 lb 15.3 oz (103.4 kg)        Allergies No Known Allergies   NPO Status DIET CARB CONTROL; Carb Control: 4 carb choices (60 gms)/meal   Isolation No active isolations     LABS   Basic Metabolic Profile Recent Labs     09/21/20  0527 09/22/20  0606 09/23/20  0537    138 137    107 104   CO2 23 22 23   BUN 13 11 14   CREATININE 0.7 0.6 0.6   GLUCOSE 102* 121* 116*      Complete Blood Count Recent Labs     09/21/20  0527 09/22/20  0606   WBC 9.6 17.1*   RBC 5.22* 4.69      Coagulation Studies No results for input(s): PTT, INR in the last 72 hours. Invalid input(s): PLATELETS, PROA, PT, PTTA     MEDICATIONS   Inpatient Medications     sennosides-docusate sodium, 2 tablet, Oral, Daily    heparin (porcine), 5,000 Units, Subcutaneous, 3 times per day    sodium chloride flush, 10 mL, Intravenous, 2 times per day    levETIRAcetam, 500 mg, Oral, BID    metoprolol succinate, 25 mg, Oral, Daily    atorvastatin, 80 mg, Oral, Nightly   Infusions      Antibiotics   Recent Abx Admin      No antibiotic orders with administrations found.                  Neurologic Exam:    Mental status: Patient awake, alert, oriented X 4, flat affect     Cranial Nerves:  II: Visual acuity not tested, visual fields intact, denies new visual changes / diplopia  III, IV, VI: PERRL, 3 mm bilaterally, EOMI, no nystagmus noted  V: Facial sensation intact bilaterally to touch  VII: Face symmetric  VIII: Hearing intact bilaterally to spoken voice  IX: Palate movement equal bilaterally  XI: Shoulder shrug equal bilaterally  XII: Tongue midline    Musculoskeletal:   Gait: Not tested   Tone: normal  Sensory: intact   Motor strength:    Right  Left    Right  Left    Deltoid  5 5  Hip Flex  5 5   Biceps  5 5  Knee Extensors  5 5   Triceps  5 5  Knee Flexors  5 5   Wrist Ext  5 5  Ankle Dorsiflex. 5 5   Wrist Flex  5 5  Ankle Plantarflex. 5 5   Handgrip  5 5  Ext Edmundo Longus  5 5   Thumb Ext  5 5         Incision: staples, open to air, clean/dry/intact       Respiratory:  Unlabored respiratory pattern    Abdomen:   Soft, ND   Last BM 9/19/2020    Cardiovascular:  Warm, well perfused    Assessment   48year old female POD 2 s/p LEFT FRONTAL CRANIOTOMY FOR RESECTION OF CAVERNOMA    Plan:  1. Neurologic exam frequency: q 4 hours  2. Mobility: PT/OT, activity as tolerated, appreciate recs, patient has 3 flights of stairs at home if PT could work on stairs before discharge   3. Seizure Prophylaxis: Keppra 500mg BID  4. Diet: carb control   5. DVT Prophylaxis: SCDs and heparin SQ  6. Bowel Regimen: senokot-S  7. Pain control: prn tylenol, roxicodone   8. Incisional Care: cleanse daily with soap and water, pat dry and paint with CHG swab  9. Appreciate medical team assistance with management, hematology consult for protein s deficiency   10. Dispo Planning: will discharge home tomorrow     Patient was discussed with Dr. Arina Walker who agrees with above assessment and plan.      Electronically signed   9/23/2020 8:44 AM   [phone]

## 2020-09-23 NOTE — PROGRESS NOTES
VSS with exception to low grade fever of 99 overnight. NIH 0. Flat affect noted. Pt A/Ox4. Crani incision DANNI, staples and CDI. Up x1 walker and GB. Voids BRP. Pain controlled with PO. Bed alarm set. Call light in reach. Will continue to monitor.

## 2020-09-23 NOTE — PROGRESS NOTES
Occupational Therapy  Facility/Department: [facility] 5T ORTHO/NEURO  Daily Treatment Note  NAME: Allen Olivas  : 1970  MRN: [id number]    Date of Service: 2020    Discharge Recommendations: Allen Olivas scored a 19/24 on the AM-PAC ADL Inpatient form. Current research shows that an AM-PAC score of 18 or greater is typically associated with a discharge to the patient's home setting. Based on the patient's AM-PAC score, and their current ADL deficits, it is recommended that the patient have 2-3 sessions per week of Occupational Therapy at d/c to increase the patient's independence. At this time, this patient demonstrates the endurance and safety to discharge home and a follow up treatment frequency of 2-3x/wk. Please see assessment section for further patient specific details. If patient discharges prior to next session this note will serve as a discharge summary. Please see below for the latest assessment towards goals. 2-3 sessions per week, 24 hour supervision or assist, Home with Home health OT  OT Equipment Recommendations  Equipment Needed: No    Assessment   Performance deficits / Impairments: Decreased functional mobility ; Decreased ADL status; Decreased strength;Decreased endurance  Assessment: Pt limited by migraine pain this session. Pt demonstrated a flat affect throughout session and appears to have impulsivity and decreased safety awareness/insight (ex: walking without the walker/going to sit down without looking back.) Pt educated on fall prevention within her home and able to state a fall prevention tip. Pt could beneift from continued OT services per POC to maximize independence and safety for ADLs/functional mobility. Treatment Diagnosis: decreased functional mobility/ADLs. Prognosis: Good  OT Education: OT Role;Plan of Care;Family Education;Precautions; ADL Adaptive Strategies;Transfer Training  Patient Education: Educated pt on home safety recommendations and fall prevention tips. Pt able to recall fall prevention tip (reduce clutter). Pt verbalized understanding and in agreement. Handouts provided. Will require reinforcement  Barriers to Learning: decreased insight/awareness  REQUIRES OT FOLLOW UP: Yes  Activity Tolerance  Activity Tolerance: Patient Tolerated treatment well;Patient limited by pain  Safety Devices  Safety Devices in place: Yes  Type of devices: Call light within reach; All fall risk precautions in place;Nurse notified; Left in bed;Bed alarm in place         Restrictions  Position Activity Restriction  Other position/activity restrictions: Up with assist  Subjective   General  Chart Reviewed: Yes  Patient assessed for rehabilitation services?: Yes  Additional Pertinent Hx: 48 y.o. F presenting w/ c/o RUE numbness and weakness with trouble speaking. Hospital Course: (+) acute intracranial hemorrhage in the left frontal lobe; CTA Head/Neck: neg; CT Chest Pulm: neg;  Family / Caregiver Present: No  Referring Practitioner: Dr. Grazyna Yi  Diagnosis: s/p L frontal craniotomy  Subjective  Subjective: Pt supine in bed, reported having a migraine this session. Pt with a flat affect but agreeable to session.   General Comment  Comments: RN okay for therapy  Vital Signs  Patient Currently in Pain: Yes(Pt reported migraine, RN aware.)   Orientation  Orientation  Overall Orientation Status: Within Functional Limits  Objective    ADL  Grooming: Contact guard assistance;Verbal cueing(CGA and verbal cues - Standing at the sink- pt washed her hands)  LE Dressing: Contact guard assistance;Verbal cueing(Seated EOB: CGA to reposition sock and pt repositioned other sock)  Toileting: Contact guard assistance(urinated in toilet, pt able to complete clothing mgt)        Balance  Sitting Balance: Stand by assistance(EOB and toilet)  Standing Balance: Contact guard assistance(+ RW)  Standing Balance  Time: ~30 secs, ~2 mins  Activity: walk to bathroom, grooming at sink/walk in andersen  Functional Mobility  Functional - Mobility Device: Rolling Walker  Activity: To/from bathroom; Other(walk in andersen)  Assist Level: Contact guard assistance  Functional Mobility Comments: patient very impulsive, decreased awareness and insight to safety  Toilet Transfers  Toilet - Technique: Ambulating  Equipment Used: Standard toilet  Toilet Transfer: Contact guard assistance  Bed mobility  Supine to Sit: Supervision(head of bed elevated)  Sit to Supine: Stand by assistance(head of bed elevated, +cues for getting herself into bed)  Scooting: Supervision(to EOB)  Transfers  Sit to stand: Stand by assistance(to RW, +cues for handplacement)  Stand to sit: Contact guard assistance(RW, +cues for handplacement)                       Cognition  Overall Cognitive Status: Exceptions  Arousal/Alertness: Delayed responses to stimuli  Following Commands: Follows one step commands with repetition; Follows one step commands with increased time  Attention Span: Difficulty attending to directions  Safety Judgement: Decreased awareness of need for assistance;Decreased awareness of need for safety  Problem Solving: Assistance required to correct errors made;Assistance required to generate solutions;Assistance required to implement solutions  Insights: Decreased awareness of deficits  Initiation: Requires cues for some  Sequencing: Requires cues for some                                         Plan   Plan  Times per week: 5-7x  Current Treatment Recommendations: Patient/Caregiver Education & Training, Balance Training, Functional Mobility Training, Safety Education & Training, Self-Care / ADL, Home Management Training      AM-PAC Score        AM-[facility] Inpatient Daily Activity Raw Score: 19 (09/23/20 1551)  AM-PAC Inpatient ADL T-Scale Score : 40.22 (09/23/20 1551)  ADL Inpatient CMS 0-100% Score: 42.8 (09/23/20 1551)  ADL Inpatient CMS G-Code Modifier : CK (09/23/20 1551)    Goals  Short term goals  Time Frame for Short term goals: by d/c  Short term goal 1: Complete LB ADLs with mod I -ongoing, not met-  Short term goal 2: Complete toileting with mod I -ongoing, not met-  Short term goal 3: Complete functional transfers with mod I -ongoing, not met-  Short term goal 4: Pt will verbalize 3 home safety techniques to decrease the risk of falls upon d/c home. -ongoing, not met-       Therapy Time   Individual Concurrent Group Co-treatment   Time In 1509         Time Out 1527         Minutes 18         Timed Code Treatment Minutes: 18 Minutes   Total Timed Treatment Minutes: 18 Minutes    If patient is discharged prior to next treatment, this note will serve as the discharge. Crista Eugene, S/OT    A licensed therapist was present, directed the patient's care, made skilled judgement, and was responsible for assessment and treatment of the patient.

## 2020-09-23 NOTE — PLAN OF CARE
Problem: Pain:  Goal: Pain level will decrease  Description: Pain level will decrease  9/23/2020 1107 by Ann Carrillo RN  Outcome: Ongoing  Note: Pt denies pain at this time. Pt is up to chair.  Will continue to monitor   9/23/2020 0232 by Parvez Guerrero RN  Outcome: Met This Shift

## 2020-09-23 NOTE — PROGRESS NOTES
4 Eyes Admission Assessment     I agree as the admission nurse that 2 RN's have performed a thorough Head to Toe Skin Assessment on the patient. ALL assessment sites listed below have been assessed on admission.        Areas assessed by both nurses:   [x]   Head, Face, and Ears   [x]   Shoulders, Back, and Chest  [x]   Arms, Elbows, and Hands   [x]   Coccyx, Sacrum, and Ischium  [x]   Legs, Feet, and Heels        Does the Patient have Skin Breakdown?none with exception to surgical incision        Pravin Prevention initiated:  Yes   Wound Care Orders initiated:  No      [facility] nurse consulted for Pressure Injury (Stage 3,4, Unstageable, DTI, NWPT, and Complex wounds) or Pravin score 18 or lower:  No      Nurse 1 eSignature: Electronically signed by Sherif Benoit RN on 9/22/20 at 10:48 PM EDT    **SHARE this note so that the co-signing nurse is able to place an eSignature**    Nurse 2 eSignature: Electronically signed by Margot Patel RN on 9/23/20 at 3:21 AM EDT

## 2020-09-23 NOTE — PROGRESS NOTES
Pt A/O x4, Pt has flat affect. Pt NIH remains unchanged at 0. Pt is up X1 with walker and gaitbelt. Pt BP has been elevated during day, MD was notified and PRN Hydralazine was ordered. Pt does not have much of an appetite today. Fall precautions in place.  Will continue to monitor

## 2020-09-23 NOTE — CARE COORDINATION
Pt will be ready for DC tomorrow 9/24 per MD. [facility] cannot take pt, but [facility] can. Will continue to follow till DC. Pt will also receive shower chair with [facility].   Electronically signed by Kathy Pollard RN on 9/23/2020 at 12:14 PM

## 2020-09-23 NOTE — PROGRESS NOTES
Physical Therapy  Facility/Department: [facility] 5T ORTHO/NEURO  Daily Treatment Note  NAME: Kendrick Nielson  : 1970  MRN: [id number]    Date of Service: 2020    Discharge Recommendations:    Kendrick Nielson scored a 19/24 on the AM-PAC short mobility form. Current research shows that an AM-PAC score of 18 or greater is typically associated with a discharge to the patient's home setting. Based on the patient's AM-PAC score and their current functional mobility deficits, it is recommended that the patient have 2-3 sessions per week of Physical Therapy at d/c to increase the patient's independence. At this time, this patient demonstrates the endurance and safety to discharge home with home PT and a follow up treatment frequency of 2-3x/wk. Please see assessment section for further patient specific details. PT Equipment Recommendations  Equipment Needed: Yes  Mobility Devices: Jethro Ronde: Rolling    Assessment   Assessment: Patient continues with flat affect and impulsive mobility with verbal cues for safety awareness throughout session and safe use of walker. Gait remains slightly unsteady, particularly with turns but no overt loss of balance noted. Patient reports plan for d/c home tomorrow with assistance from sister; will need to perform stair training prior to discharge. Will continue to follow per POC. Treatment Diagnosis: Decreased gait secondary to brain bleed. Prognosis: Good  Patient Education: Role of PT. Pt verbalized partial understanding and would benefit from ongoing education. REQUIRES PT FOLLOW UP: Yes  Activity Tolerance  Activity Tolerance: Patient limited by fatigue;Patient limited by endurance; Patient limited by pain  Activity Tolerance: patient reporting migraine headache throughout treatment; RN aware and medication given after ambulation     Patient Diagnosis(es): There were no encounter diagnoses. has a past medical history of Pulmonary embolism ([facility] [address]    has a past surgical history that includes Cholecystectomy and craniotomy (Left, 9/21/2020). Restrictions  Position Activity Restriction  Other position/activity restrictions: Up with assist  Subjective   General  Chart Reviewed: Yes  Additional Pertinent Hx: Pt to [place] ED 9/17 with stroke like symptoms. Head CT: (+) Left frontal lobe ICH. CTA head/neck: no stenosis, cerebral aneurysm or AVM. Transferred to ICU at [facility] for Neurosurgery. Pt to OR 9/21 s/p s/p L Frontal Crani for resection of cavernoma. PMH:  None per chart  Response To Previous Treatment: Patient reporting fatigue but able to participate. Family / Caregiver Present: No  Subjective  Subjective: Patient supine in bed upon arrival.  General Comment  Comments: Patient agreeable to PT treatment, reporting migraine headache and swelling of left eye. Pain Screening  Patient Currently in Pain: Yes(7/10 migraine, RN aware, pain medication provided at end of session)  Vital Signs  Patient Currently in Pain: Yes(7/10 migraine, RN aware, pain medication provided at end of session)       Orientation  Orientation  Overall Orientation Status: Within Functional Limits  Cognition   Cognition  Overall Cognitive Status: Exceptions  Arousal/Alertness: Delayed responses to stimuli  Following Commands: Follows one step commands with repetition; Follows one step commands with increased time  Attention Span: Difficulty attending to directions  Safety Judgement: Decreased awareness of need for assistance;Decreased awareness of need for safety  Problem Solving: Assistance required to correct errors made;Assistance required to generate solutions;Assistance required to implement solutions  Insights: Decreased awareness of deficits  Initiation: Requires cues for some  Sequencing: Requires cues for some  Objective   Bed mobility  Supine to Sit: Supervision(head of bed elevated)  Sit to Supine: Supervision(head of bed elevated)  Scooting: Supervision(to EOB)  Transfers  Sit to Time In 1114         Time Out 1138         Minutes 24         Timed Code Treatment Minutes: 24 Minutes     If patient discharges prior to next session this note will serve as a discharge summary. Please see below for the latest assessment towards goals.    Nikki MIN [address]

## 2020-09-23 NOTE — PROGRESS NOTES
pulses. Heart sounds: Normal heart sounds. Pulmonary:      Effort: Pulmonary effort is normal.      Breath sounds: Normal breath sounds. Abdominal:      General: Abdomen is flat. Palpations: Abdomen is soft. Musculoskeletal: Normal range of motion. General: No swelling or tenderness. Skin:     General: Skin is warm and dry. Capillary Refill: Capillary refill takes less than 2 seconds. Neurological:      General: No focal deficit present. Mental Status: She is alert and oriented to person, place, and time. Psychiatric:         Mood and Affect: Mood normal.         Behavior: Behavior normal.           Labs/Imaging/Diagnostics   Labs:  CBC:   Recent Labs     09/21/20 0527 09/22/20  0606 09/23/20  0837   WBC 9.6 17.1* 14.6*   HGB 14.2 12.8 14.0   HCT 43.8 39.4 42.6   MCV 84.0 83.9 84.0    142 143     BMP:   Recent Labs     09/21/20 0527 09/22/20 0606 09/23/20  0537    138 137   K 4.1 3.8 4.1    107 104   CO2 23 22 23   BUN 13 11 14   CREATININE 0.7 0.6 0.6     Mag: No results found for: MG  LIVER PROFILE: No results for input(s): AST, ALT, LIPASE, BILIDIR, BILITOT, ALKPHOS in the last 72 hours. Invalid input(s): AMYLASE,  ALB  PT/INR:   No results for input(s): PROTIME, INR in the last 72 hours. APTT: No results for input(s): APTT in the last 72 hours. BNP:  No results for input(s): BNP in the last 72 hours. CARDIAC ENZYMES:   No results for input(s): CKTOTAL, CKMB, TROPONINI in the last 72 hours. Imaging Last 24 Hours:  MRI Brain wo Contrast   Final Result      Postoperative changes of left frontal craniotomy with resection of associated hemorrhagic lesion compatible cavernoma. Minimal residual popcorn-like T2 signal in the left frontal lobe with hemosiderin staining may be postoperative. A small component    residual cavernoma be difficult to exclude. Continued follow-up is suggested.       Postoperative diffusion restriction compatible with

## 2020-09-24 VITALS
OXYGEN SATURATION: 100 % | HEART RATE: 87 BPM | SYSTOLIC BLOOD PRESSURE: 128 MMHG | BODY MASS INDEX: 37.98 KG/M2 | WEIGHT: 227.96 LBS | RESPIRATION RATE: 16 BRPM | DIASTOLIC BLOOD PRESSURE: 82 MMHG | TEMPERATURE: 98.6 F | HEIGHT: 65 IN

## 2020-09-24 LAB
ANION GAP SERPL CALCULATED.3IONS-SCNC: 14 MMOL/L (ref 3–16)
BUN BLDV-MCNC: 11 MG/DL (ref 7–20)
CALCIUM SERPL-MCNC: 8.7 MG/DL (ref 8.3–10.6)
CHLORIDE BLD-SCNC: 102 MMOL/L (ref 99–110)
CO2: 21 MMOL/L (ref 21–32)
CREAT SERPL-MCNC: 0.6 MG/DL (ref 0.6–1.1)
GFR AFRICAN AMERICAN: >60
GFR NON-AFRICAN AMERICAN: >60
GLUCOSE BLD-MCNC: 116 MG/DL (ref 70–99)
HCT VFR BLD CALC: 43.1 % (ref 36–48)
HEMOGLOBIN: 14.1 G/DL (ref 12–16)
MCH RBC QN AUTO: 27.7 PG (ref 26–34)
MCHC RBC AUTO-ENTMCNC: 32.8 G/DL (ref 31–36)
MCV RBC AUTO: 84.4 FL (ref 80–100)
PDW BLD-RTO: 14.2 % (ref 12.4–15.4)
PLATELET # BLD: 139 K/UL (ref 135–450)
PMV BLD AUTO: 9.2 FL (ref 5–10.5)
POTASSIUM SERPL-SCNC: 4.9 MMOL/L (ref 3.5–5.1)
RBC # BLD: 5.11 M/UL (ref 4–5.2)
SODIUM BLD-SCNC: 137 MMOL/L (ref 136–145)
WBC # BLD: 14.1 K/UL (ref 4–11)

## 2020-09-24 PROCEDURE — 6370000000 HC RX 637 (ALT 250 FOR IP): Performed by: NEUROLOGICAL SURGERY

## 2020-09-24 PROCEDURE — 6360000002 HC RX W HCPCS: Performed by: INTERNAL MEDICINE

## 2020-09-24 PROCEDURE — 6370000000 HC RX 637 (ALT 250 FOR IP): Performed by: NURSE PRACTITIONER

## 2020-09-24 PROCEDURE — 2580000003 HC RX 258: Performed by: NEUROLOGICAL SURGERY

## 2020-09-24 PROCEDURE — 85027 COMPLETE CBC AUTOMATED: CPT

## 2020-09-24 PROCEDURE — 97116 GAIT TRAINING THERAPY: CPT

## 2020-09-24 PROCEDURE — 97530 THERAPEUTIC ACTIVITIES: CPT

## 2020-09-24 PROCEDURE — 97535 SELF CARE MNGMENT TRAINING: CPT

## 2020-09-24 PROCEDURE — 36415 COLL VENOUS BLD VENIPUNCTURE: CPT

## 2020-09-24 PROCEDURE — 6360000002 HC RX W HCPCS: Performed by: NURSE PRACTITIONER

## 2020-09-24 PROCEDURE — 80048 BASIC METABOLIC PNL TOTAL CA: CPT

## 2020-09-24 RX ORDER — ASPIRIN 81 MG/1
81 TABLET, CHEWABLE ORAL DAILY
Status: DISCONTINUED | OUTPATIENT
Start: 2020-09-24 | End: 2020-09-24 | Stop reason: HOSPADM

## 2020-09-24 RX ORDER — ASPIRIN 81 MG/1
81 TABLET, CHEWABLE ORAL DAILY
Qty: 30 TABLET | Refills: 3 | Status: SHIPPED | OUTPATIENT
Start: 2020-09-24 | End: 2021-01-14 | Stop reason: HOSPADM

## 2020-09-24 RX ORDER — SENNA AND DOCUSATE SODIUM 50; 8.6 MG/1; MG/1
2 TABLET, FILM COATED ORAL DAILY
Qty: 30 TABLET | Refills: 0 | Status: SHIPPED | OUTPATIENT
Start: 2020-09-25 | End: 2021-01-14 | Stop reason: HOSPADM

## 2020-09-24 RX ADMIN — HEPARIN SODIUM 5000 UNITS: 5000 INJECTION INTRAVENOUS; SUBCUTANEOUS at 15:29

## 2020-09-24 RX ADMIN — HYDRALAZINE HYDROCHLORIDE 10 MG: 20 INJECTION INTRAMUSCULAR; INTRAVENOUS at 03:07

## 2020-09-24 RX ADMIN — METOPROLOL SUCCINATE 25 MG: 50 TABLET, EXTENDED RELEASE ORAL at 09:33

## 2020-09-24 RX ADMIN — ASPIRIN 81 MG: 81 TABLET, CHEWABLE ORAL at 15:29

## 2020-09-24 RX ADMIN — DOCUSATE SODIUM 50 MG AND SENNOSIDES 8.6 MG 2 TABLET: 8.6; 5 TABLET, FILM COATED ORAL at 09:33

## 2020-09-24 RX ADMIN — LEVETIRACETAM 500 MG: 500 TABLET, FILM COATED ORAL at 09:33

## 2020-09-24 RX ADMIN — ACETAMINOPHEN 650 MG: 325 TABLET ORAL at 11:21

## 2020-09-24 RX ADMIN — HEPARIN SODIUM 5000 UNITS: 5000 INJECTION INTRAVENOUS; SUBCUTANEOUS at 06:29

## 2020-09-24 RX ADMIN — Medication 10 ML: at 09:33

## 2020-09-24 ASSESSMENT — PAIN DESCRIPTION - PROGRESSION: CLINICAL_PROGRESSION: NOT CHANGED

## 2020-09-24 ASSESSMENT — PAIN DESCRIPTION - ONSET: ONSET: ON-GOING

## 2020-09-24 ASSESSMENT — PAIN SCALES - GENERAL
PAINLEVEL_OUTOF10: 2
PAINLEVEL_OUTOF10: 4

## 2020-09-24 ASSESSMENT — PAIN DESCRIPTION - DESCRIPTORS: DESCRIPTORS: HEADACHE

## 2020-09-24 ASSESSMENT — PAIN DESCRIPTION - PAIN TYPE: TYPE: SURGICAL PAIN

## 2020-09-24 ASSESSMENT — PAIN DESCRIPTION - ORIENTATION: ORIENTATION: LEFT

## 2020-09-24 ASSESSMENT — PAIN DESCRIPTION - LOCATION: LOCATION: HEAD

## 2020-09-24 ASSESSMENT — PAIN - FUNCTIONAL ASSESSMENT: PAIN_FUNCTIONAL_ASSESSMENT: ACTIVITIES ARE NOT PREVENTED

## 2020-09-24 ASSESSMENT — PAIN DESCRIPTION - FREQUENCY: FREQUENCY: CONTINUOUS

## 2020-09-24 NOTE — PLAN OF CARE
Problem: HEMODYNAMIC STATUS  Goal: Patient has stable vital signs and fluid balance  Outcome: Ongoing  B/P elevated at times, remainder of VSS. Encourage pt to take in fluids. Problem: COMMUNICATION IMPAIRMENT  Goal: Ability to express needs and understand communication  Outcome: Ongoing   Pt has flat affect and does yes and no answers. Problem: Pain:  Goal: Pain level will decrease  Outcome: Ongoing   Pt denies needing any prn pain medication during this shift. Encourage pt to call if pain is noted. Will continue to monitor. Problem: Falls - Risk of:  Goal: Will remain free from falls  Outcome: Ongoing   Pt remains free from injury during this shift. Pt is up with assistance x 1 person with walker and gait belt. Encourage pt to call for all assistance. Call light is in reach, bed alarm is activated for safety, bed locked and in lowest position. Will continue to monitor.

## 2020-09-24 NOTE — PROGRESS NOTES
Patient discharged home with family member. Discharge instructions reviewed with patient, stroke folder sent home with patient and stroke symptoms reviewed with patient. IV removed. All patient belongings gathered and sent home with patient, including patient's walker for home. Patient left the unit via wheelchair.

## 2020-09-24 NOTE — DISCHARGE SUMMARY
Hospital Discharge Summary    Patient's PCP: Mary Rowley  Admit Date: 9/17/2020   Discharge Date: 9/24/20    Admitting Physician: Dr. Marianne Plasencia MD  Discharge Physician: Dr. Nahum Campos:   Nirmala Su    Brief HPI: Mary read 48 y.o. female w/ PMHx of DVT and PE (on Xarelto) presented to [facility] with chief complain of speech difficulty, numbness, and tingling of the right hand. Pt mentions that she had mild dysarthria started last night at 12:00 am. She went to bed and when she woke up she still had dysarthria. She also started having weakness and numbness of the fourth and fifth digits of her right hands. She noticed this symptoms when she was working from home and was typing. She did not have a headache, chest pain, nausea/vomiting, palpitation, dizziness, and seizure. She mentions that she have been having some SOB recently. She also had RLE weakness that lasted a few minutes a couple weeks ago. However, since her symptoms resolved within a few minutes, she decided not to seek medical help. She called her PCP and was told to go to ER. She presented to [facility] ED.      At [facility] ED, her BP was 179/88, HR 90, RR 20. Emergent CT of the head reveals a9 mm focus of acute intracranial hemorrhage in the left frontal lobe. She received 2 g of Keppra, Kcentra, nicardipine ggt due to a blood pressure for her high blood pressure. Neurosurgery was consulted and patient was transferred to the [facility] in [place]. Brief hospital course:   1.  ICH, hemorrhagic cavernoma, s/p LEFT FRONTAL CRANIOTOMY FOR RESECTION OF CAVERNOMA on 09/21/20  -Stop Xarelto for 4 weeks, discussed with neurosurgery will follow-up with neurosurgery in 1 to 2 weeks  -discussed with hematology oncology, okay to give aspirin 81 mg.   -Follow-up with PCP in 1 to 2 weeks  -Continue Keppra twice daily  -Home care therapy    2. Hx of thrombophilia  Has seen  Hematology in past.  protein s def. Holding anticogulant as above      Invasive procedures:  s/p LEFT FRONTAL CRANIOTOMY FOR RESECTION OF CAVERNOMA on 09/21/20      Discharge Diagnoses: Active Problems:    Brain bleed ([address]  Resolved Problems:    * No resolved hospital problems. *      Physical Exam: /82   Pulse 87   Temp 98.6 °F (37 °C) (Oral)   Resp 16   Ht 5' 5\" (1.651 m)   Wt 227 lb 15.3 oz (103.4 kg)   SpO2 100%   BMI 37.93 kg/m²   Gen/overall appearance: Not in acute distress. Alert. Head: Normocephalic, atraumatic  Eyes: EOMI, good acuity  ENT:- Oral mucosa moist  Neck: No JVD, thyromegaly  CVS: Nml S1S2, no MRG, RRR  Pulm: Clear bilaterally. No crackles/wheezes  Gastrointestinal: Soft, NT/ND, +BS  Musculoskeletal: No edema. Warm  Neuro: No focal deficit. Moves extremity spontaneously. Psychiatry: Appropriate affect. Not agitated. Skin: Warm, dry with normal turgor. No rash        Significant diagnostic studies that may require follow up:  Ct Head Without Contrast    Result Date: 9/18/2020  Patient: Lars Winkler  Time Out: 00:45 Exam(s): CT HEAD Without Contrast  EXAM:   CT Head Without Intravenous Contrast  CLINICAL HISTORY:    Reason for exam: Intracranial hemorrhage. Reason for exam:- >Intracranial hemorrhage. Has a \"code stroke\" or \"stroke alert\" been called?->No. Is the patient pregnant?->No.  TECHNIQUE:   Axial computed tomography images of the head/brain without intravenous contrast.  CTDI is 67.82 mGy and DLP is 1228.89 mGy-cm. All CT scans at this facility use dose modulation, iterative reconstruction, and/or weight based dosing when appropriate to reduce radiation dose to as low as reasonably achievable. COMPARISON:   9/17/20, 1647 hrs.   FINDINGS:   No significant interval change in an 8 mm ill-defined hyperdense lesion within the left frontal lobe, at the gray matter white matter junction. No significant surrounding edema. No midline shift. No new hemorrhage. No hydrocephalus. No calvarial fracture. Electronically signed by Kenya Luque MD on 09-18-20 at [place] [address]     No significant interval change in an 8 mm ill-defined hyperdense lesion within the left frontal lobe, at the gray matter white matter junction. Differential diagnosis includes intraparenchymal hemorrhagic contusion secondary to trauma, hemorrhagic metastasis, or cavernous malformation. Ct Head Wo Contrast    Result Date: 9/17/2020  EXAMINATION: CT OF THE HEAD WITHOUT CONTRAST  9/17/2020 5:11 pm TECHNIQUE: CT of the head was performed without the administration of intravenous contrast. Dose modulation, iterative reconstruction, and/or weight based adjustment of the mA/kV was utilized to reduce the radiation dose to as low as reasonably achievable. COMPARISON: None. HISTORY: ORDERING SYSTEM PROVIDED HISTORY: dysarthria, weakness right hand TECHNOLOGIST PROVIDED HISTORY: Reason for exam:->dysarthria, weakness right hand Has a \"code stroke\" or \"stroke alert\" been called? ->Yes Is the patient pregnant?->No Reason for Exam: dysarthria, weakness right hand Acuity: Acute Type of Exam: Initial FINDINGS: BRAIN/VENTRICLES: There is a small focus of high density measuring 9 mm in the left frontal lobe at the gray-white junction. No evidence of intracranial hemorrhage elsewhere or evidence of acute intracranial process elsewhere. Pablo Renard No midline shift or hydrocephalus. ORBITS: The visualized portion of the orbits demonstrate no acute abnormality. SINUSES: The visualized paranasal sinuses and mastoid air cells demonstrate no acute abnormality. SOFT TISSUES/SKULL:  No acute abnormality of the visualized skull or soft tissues. 9 mm focus of acute intracranial hemorrhage in the left frontal lobe. Critical results were called by Dr. Lolis Brady.  MD Otis to [facility] on 3/75/0141 at 17:36. Mri Brain W Contrast    Result Date: 9/21/2020  Exam:MRI BRAIN W CONTRAST Date:9/21/2020 8:22 AM Indication: Brain mass Comparison: 2 days prior Technique: Multiplanar multisequence MR images obtained of the brain with intravenous contrast. Contrast:  20 mL ProHance intravenous FINDINGS: Unchanged focus of hyperintense T1 signal within the left anterior frontal lobe compatible with a small amount of hemorrhage. This is associated with a heterogeneous hypointense signal lesion, better evaluated on recent conventional MRI previously demonstrating a popcorn-like appearance. No significant abnormal edema. There may be minimal underlying enhancement. No associated minimal venous anomaly. Normal ventricular and sulcal size. No other intracranial lesions. Partially empty sella. Stable small focus of hemorrhage within the left frontal lobe likely representing a hemorrhagic cavernoma. No visible associated developmental venous anomaly. Ct Chest Pulmonary Embolism W Contrast    Result Date: 9/17/2020  EXAMINATION: CTA OF THE CHEST 9/17/2020 5:16 pm TECHNIQUE: CTA of the chest was performed after the administration of intravenous contrast.  Multiplanar reformatted images are provided for review. MIP images are provided for review. Dose modulation, iterative reconstruction, and/or weight based adjustment of the mA/kV was utilized to reduce the radiation dose to as low as reasonably achievable. COMPARISON: None. HISTORY: ORDERING SYSTEM PROVIDED HISTORY: sob, cp, hx pe, anticoa ro pe TECHNOLOGIST PROVIDED HISTORY: Reason for exam:->sob, cp, hx pe, anticoa ro pe Reason for Exam: pe? Acuity: Acute Type of Exam: Initial FINDINGS: Motion artifact decreases sensitivity and specificity of the study. There is a focal nodular density marginating the inferior lobe of the thyroid on the left measuring 1.4 cm. No specific imaging follow-up recommended based on size. No intimal flap seen in aorta. .  No pericardial effusion There is nonspecific thickening at the GE junction. No embolus is seen in the central, right, or left main pulmonary artery. No embolus is seen on the right or left, least to the subsegmental level. Respiratory motion artifact limits evaluation of fine pulmonary parenchymal change. No pneumonia. No edema. No pleural effusion. Mosaic attenuation is seen at the lung bases, suggesting small airways disease or air trapping. No pneumothorax identified Bandlike opacity seen in the right middle lobe and lingula. Bandlike morphology favors subsegmental atelectasis or scar Adrenal glands appear normal.  There are clips from prior cholecystectomy. There is subtle lobular contour to the liver. No pericardial effusion. Small hiatal hernia seen. Motion limited. No central pulmonary embolus identified. No pneumonia or edema. No pneumothorax. Cta Head Neck W Contrast    Result Date: 9/17/2020  EXAMINATION: CTA OF THE HEAD AND NECK WITH CONTRAST 9/17/2020 5:10 pm: TECHNIQUE: CTA of the head and neck was performed with the administration of intravenous contrast. Multiplanar reformatted images are provided for review. MIP images are provided for review. Stenosis of the internal carotid arteries measured using NASCET criteria. Dose modulation, iterative reconstruction, and/or weight based adjustment of the mA/kV was utilized to reduce the radiation dose to as low as reasonably achievable. COMPARISON: None. HISTORY: ORDERING SYSTEM PROVIDED HISTORY: dysarthria, weakness ro hand ro lvo TECHNOLOGIST PROVIDED HISTORY: Reason for exam:->dysarthria, weakness ro hand ro lvo Reason for Exam: dysarthria, weakness ro hand ro lvo Acuity: Acute Type of Exam: Initial FINDINGS: CTA NECK: AORTIC ARCH/ARCH VESSELS: No dissection or arterial injury. No significant stenosis of the brachiocephalic or subclavian arteries.  CAROTID ARTERIES: No dissection, arterial injury, or hemodynamically significant stenosis by NASCET ischemic change about the surgical tract and left parasagittal frontal lobe. Trace left frontal subdural hematoma. Treatments: As above.       Discharge Medications:     Medication List      START taking these medications    aspirin 81 MG chewable tablet  Take 1 tablet by mouth daily  Notes to patient:  Aspirin  USE--Prevents heart attack and stroke (decreases platelet adhesion)  SIDE EFFECTS-- Stomach upset, bleeding/bruising more easily     atorvastatin 80 MG tablet  Commonly known as:  LIPITOR  Take 1 tablet by mouth nightly  Notes to patient:  Atorvastatin (Lipitor)  USE--  Lower cholesterol, prevent heart attack/ stroke  SIDE EFFECTS-- headache, muscle pain/ weakness     levETIRAcetam 500 MG tablet  Commonly known as:  KEPPRA  Take 1 tablet by mouth 2 times daily  Notes to patient:  Side effects: diarrhea, dizziness, headache     metoprolol succinate 25 MG extended release tablet  Commonly known as:  TOPROL XL  Take 1 tablet by mouth daily  Notes to patient:  Metoprolol  (Lopressor, Toprol XL)  USE--  Lower blood pressure, prevent heart attack, treat heart failure  SIDE EFFECTS--  Dizziness, feeling tired, low blood pressure     sennosides-docusate sodium 8.6-50 MG tablet  Commonly known as:  SENOKOT-S  Take 2 tablets by mouth daily  Notes to patient:  Docusate & Senna  (Senokot- S)  USE--stool softener and laxative, treats constipation  SIDE EFFECTS--  Stomach upset        STOP taking these medications    Necon 7/7/7 0.5/0.75/1-35 MG-MCG per tablet  Generic drug:  norethindrone-ethinyl estradiol     Xarelto 20 MG Tabs tablet  Generic drug:  rivaroxaban           Where to Get Your Medications      These medications were sent to [facility], [place] - [address] [address] [phone] Vero Allen [phone]  [facility] [address] [facility]    Phone:  [phone]   · aspirin 81 MG chewable tablet  · atorvastatin 80 MG tablet  · levETIRAcetam 500 MG tablet  · metoprolol succinate 25 MG extended release tablet  · sennosides-docusate sodium 8.6-50 MG tablet         Activity: activity as tolerated  Diet: No diet orders on file      Disposition: home  Discharged Condition: Stable  Follow Up:   Neftali Goff [facility].  [address] [address] [phone] [address] [facility] [address] [place] [address]            Code status:  Prior         Total time spent on discharge, finalizing medications, referrals and arranging outpatient follow up was more than 45 minutes      Thank you Dr. Wendy Feliciano for the opportunity to be involved in this patients care.

## 2020-09-24 NOTE — PROGRESS NOTES
Occupational Therapy  Facility/Department: [facility] 5T ORTHO/NEURO  Daily Treatment Note  NAME: Kathy Soliz  : 1970  MRN: [id number]    Date of Service: 2020    Discharge Recommendations:  Kathy Soliz scored a 20/24 on the AM-PAC ADL Inpatient form. Current research shows that an AM-PAC score of 18 or greater is typically associated with a discharge to the patient's home setting. Based on the patient's AM-PAC score, and their current ADL deficits, it is recommended that the patient have 2-3 sessions per week of Occupational Therapy at d/c to increase the patient's independence. At this time, this patient demonstrates the endurance and safety to discharge home with 24hr assist and HHOT and a follow up treatment frequency of 2-3x/wk. Please see assessment section for further patient specific details. If patient discharges prior to next session this note will serve as a discharge summary. Please see below for the latest assessment towards goals. 2-3 sessions per week, 24 hour supervision or assist, Home with Home health OT       Assessment    Pt progressing well however continues to have flat affect and decreased awareness of deficits. Pt completing UE and LE dressing with Supervision and VCs for safety. Transfers and funtional mobility completed with RW and CGA with VCs for safe use of RW and hand placement. Pt is planning on home discharge in care of sister. Pt would benefit from 24hr assist and HHOT. Continue OT per POC            Patient Diagnosis(es): There were no encounter diagnoses. has a past medical history of Pulmonary embolism ([facility] [address] has a past surgical history that includes Cholecystectomy and craniotomy (Left, 2020). Restrictions  Position Activity Restriction  Other position/activity restrictions: Up with assist       Additional Pertinent Hx: Pt to [facility] ED  with stroke like symptoms. Head CT: (+) Left frontal lobe ICH.   CTA head/neck: no stenosis, cerebral aneurysm or AVM. Transferred to ICU at [place] [address] for Neurosurgery. Pt to OR 9/21 s/p s/p L Frontal Crani for resection of cavernoma. PMH:  None per chart      Diagnosis: s/p L frontal craniotomy  Treatment Diagnosis: decreased functional mobility/ADLs. Subjective:   Pt met supine in bed and agreeable to OT treatment. Pt with flat affect throughout session. Pain:   Denies    Objective:    Cognition/Orientation:    Bed mobility   Supine to sit:  SBA   Scooting: SBA    Functional Mobility   Sit to Stand: CGA from EOB and chair in hallway  Stand to Sit: CGA  Bed to Chair Transfer: CGA with VCs for safe RW positioning and hand placemnt. Other:  Functional mobility demonstrated to and from bathroom and down andersen with RW and CGA. Pt requiring VCs for staying inside RW. ADLs   Grooming:  SBA for oral care and washing hands and face. Pt requiring VCs for initiation of tasks  UB dressing: SBA donning T shirt  LB dressing: SBA -CGA for donning pants and underwear      Activity Tolerance:  Pt with good tolerance for all tasks    Patient Education:   Safe home set up, need for assist, safe RW use and safe transfers - pt verb understanding, may need reinforcement.      Safety Devices in Place:  Pt left in chair with alarm on and call light in reach       Plan  If pt discharges prior to next treatment, this note will serve as discharge summary  Plan  Times per week: 5-7x  Current Treatment Recommendations: Patient/Caregiver Education & Training, Balance Training, Functional Mobility Training, Safety Education & Training, Self-Care / ADL, Home Management Training    AM-PAC Score        AM-PAC Inpatient Daily Activity Raw Score: 20 (09/24/20 1030)  AM-PAC Inpatient ADL T-Scale Score : 42.03 (09/24/20 1030)  ADL Inpatient CMS 0-100% Score: 38.32 (09/24/20 1030)  ADL Inpatient CMS G-Code Modifier : Narciso Mc (09/24/20 1030)    Goals (as determined and assessed by primary OT)  Short term goals  Time Frame for Short term goals: by

## 2020-09-24 NOTE — CARE COORDINATION
Case Management Assessment            Discharge Note                    Date / Time of Note: 9/24/2020 2:11 PM                  Discharge Note Completed by: Yulissa Kendrick    Patient Name: Catalino Ruiz   YOB: 1970  Diagnosis: Brain bleed [facility]) [I61.9]   Date / Time: 9/17/2020  9:02 PM    Current PCP: Debora Blackman patient: No    Hospitalization in the last 30 days: No    Advance Directives:  Code Status: Full Code  [place] DNR form completed and on chart: No    Financial:  Payor: UMR / Plan: UMR  / Product Type: *No Product type* /      Pharmacy:    [facility] [address] Janet Jerod [phone] - f [phone]  [address] [facility] [address]  Phone: [phone] Fax: [phone]    [facility] [facility] [phone] - f [phone]  [address] [address]  Phone: [phone] Fax: [phone]      Assistance purchasing medications?:    Assistance provided by Case Management: None at this time    Does patient want to participate in local refill/ meds to beds program?:      Meds To Beds General Rules:  1. Can ONLY be done Monday- Friday between 8:30am-5pm  2. Prescription(s) must be in pharmacy by 3pm to be filled same day  3. Copy of patient's insurance/ prescription drug card and patient face sheet must be sent along with the prescription(s)  4. Cost of Rx cannot be added to hospital bill. If financial assistance is needed, please contact unit  or ;  or  CANNOT provide pharmacy voucher for patients co-pays  5.  Patients can then  the prescription on their way out of the hospital at discharge, or pharmacy can deliver to the bedside if staff is available. (payment due at time of pick-up or delivery - cash, check, or card accepted)     Able to afford home medications/ co-pay costs: Yes    ADLS:  Current PT AM-PAC Score: 19 /24  Current OT AM-PAC Score: 20 /24      DISCHARGE Disposition: Home with Home Health Care: [facility]     LOC at discharge: Not Applicable  JUDE Completed: No    Notification completed in HENS/PAS?:  Not Applicable    IMM Completed:   Not Indicated    Transportation:  Transportation PLAN for discharge: family   Mode of Transport: Private Car  Reason for medical transport: Not Applicable  Name of [address] [place] [address],[address]: Not Applicable    Home Care:  [address] ordered at discharge: Yes  [address]: [address] [phone] fax [phone]  Orders faxed: yes      Additional CM Notes: Pt aware that she is going [facility] with [facility]. The Plan for Transition of Care is related to the following treatment goals of Brain bleed [facility]) [I61.9]    The Patient and/or patient representative Jazzy Bethea and her family were provided with a choice of provider and agrees with the discharge plan Yes    Freedom of choice list was provided with basic dialogue that supports the patient's individualized plan of care/goals and shares the quality data associated with the providers.  Yes    Care Transitions patient: No    Rodolfo Rivera RN  [facility]  Case Management Department  Ph: [phone]  Fax: [phone]

## 2020-09-24 NOTE — PROGRESS NOTES
NEUROSURGERY POST-OP PROGRESS NOTE    Patient Name: Boaz Ruano YOB: 1970   Sex: Female Age: 48 yrs     Medical Record Number: [id number] Acct Number: [de-identified]   Room Number: 4447/6133-02 Hospital Day: Hospital Day: 8     Interval History:  Post-operative Day# 3 s/p LEFT FRONTAL CRANIOTOMY FOR RESECTION OF CAVERNOMA    Subjective: Patient has no specific complaints this morning, just returned from ambulating in halls with therapy. Ruddy Eisenmenger to go home. Objective:    VITAL SIGNS   BP (!) 147/90   Pulse 75   Temp 99.1 °F (37.3 °C) (Oral)   Resp 16   Ht 5' 5\" (1.651 m)   Wt 227 lb 15.3 oz (103.4 kg)   SpO2 96%   BMI 37.93 kg/m²    Height Height: 5' 5\" (165.1 cm)   Weight Weight: 227 lb 15.3 oz (103.4 kg)        Allergies No Known Allergies   NPO Status DIET CARB CONTROL; Carb Control: 4 carb choices (60 gms)/meal   Isolation No active isolations     LABS   Basic Metabolic Profile Recent Labs     09/22/20  0606 09/23/20  0537 09/24/20  0532    137 137    104 102   CO2 22 23 21   BUN 11 14 11   CREATININE 0.6 0.6 0.6   GLUCOSE 121* 116* 116*      Complete Blood Count Recent Labs     09/22/20  0606 09/23/20  0837 09/24/20  0532   WBC 17.1* 14.6* 14.1*   RBC 4.69 5.06 5.11      Coagulation Studies No results for input(s): PTT, INR in the last 72 hours. Invalid input(s): PLATELETS, PROA, PT, PTTA     MEDICATIONS   Inpatient Medications   sennosides-docusate sodium, 2 tablet, Oral, Daily    heparin (porcine), 5,000 Units, Subcutaneous, 3 times per day    sodium chloride flush, 10 mL, Intravenous, 2 times per day    levETIRAcetam, 500 mg, Oral, BID    metoprolol succinate, 25 mg, Oral, Daily    atorvastatin, 80 mg, Oral, Nightly   Infusions      Antibiotics   Recent Abx Admin      No antibiotic orders with administrations found.                  Neurologic Exam:    Mental status: Patient awake, alert, oriented X 4, flat affect     Cranial Nerves:  II: Visual acuity not tested, visual fields intact, denies new visual changes / diplopia  III, IV, VI: PERRL, 3 mm bilaterally, EOMI, no nystagmus noted  V: Facial sensation intact bilaterally to touch  VII: Face symmetric  VIII: Hearing intact bilaterally to spoken voice  IX: Palate movement equal bilaterally  XI: Shoulder shrug equal bilaterally  XII: Tongue midline    Musculoskeletal:   Gait: Not tested   Tone: normal  Sensory: intact   Motor strength:    Right  Left    Right  Left    Deltoid  5 5  Hip Flex  5 5   Biceps  5 5  Knee Extensors  5 5   Triceps  5 5  Knee Flexors  5 5   Wrist Ext  5 5  Ankle Dorsiflex. 5 5   Wrist Flex  5 5  Ankle Plantarflex. 5 5   Handgrip  5 5  Ext Edmundo Longus  5 5   Thumb Ext  5 5         Incision: staples, open to air, clean/dry/intact       Respiratory:  Unlabored respiratory pattern    Abdomen:   Soft, ND   Last BM 9/19/2020    Cardiovascular:  Warm, well perfused    Assessment   48year old female POD 3 s/p LEFT FRONTAL CRANIOTOMY FOR RESECTION OF CAVERNOMA    Plan:  1. Neurologic exam frequency: q 4 hours  2. Mobility: PT/OT, activity as tolerated, appreciate recs  3. Seizure Prophylaxis: Keppra 500mg BID  4. Diet: carb control   5. DVT Prophylaxis: SCDs and heparin SQ  6. Bowel Regimen: senokot-S  7. Pain control: prn tylenol, roxicodone   8. Incisional Care: cleanse daily with soap and water, pat dry and paint with CHG swab  9. Appreciate medical team assistance with management  10. Hematology consult for protein s deficiency and history of DVT- patient needs to be off of Xarelto for 4 weeks following surgery, okay for ASA 81 mg     Dispo Planning: okay to discharge home today    Patient was discussed with Dr. Mazin Hoang who agrees with above assessment and plan.      Electronically signed   9/24/2020 9:51 AM   [phone]

## 2020-09-28 NOTE — DISCHARGE INSTR - COC
Continuity of Care Form    Patient Name: Amber Flores   :  1970  MRN:  [id number]    Admit date:  2020  Discharge date:  ***    Code Status Order: Prior   Advance Directives:   5 [facility] Documentation     Date/Time Healthcare Directive Type of Healthcare Directive Copy in [address] [facility] [address] Agent's Name Healthcare Agent's Phone Number    20 0335  No, patient does not have an advance directive for healthcare treatment -- -- -- -- --    20  No, patient does not have an advance directive for healthcare treatment -- -- -- -- --          Admitting Physician:  Shanika Barbosa MD  PCP: Jeison Hong    Discharging Nurse: [facility] Unit/Room#: 2959/3341-96  Discharging Unit Phone Number: ***    Emergency Contact:   Extended Emergency Contact Information  Primary Emergency Contact: [place] [address] Phone: [phone]  Relation: Brother/Sister  Secondary Emergency Contact: Jeovany Stanley  Mobile Phone: [phone]  Relation: Brother/Sister    Past Surgical History:  Past Surgical History:   Procedure Laterality Date    CHOLECYSTECTOMY      CRANIOTOMY Left 2020    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     Active Problems:  Patient Active Problem List   Diagnosis Code    Brain bleed ([facility] [address] I61.9       Isolation/Infection:   Isolation          No Isolation        Patient Infection Status     Infection Onset Added Last Indicated Last Indicated By Review Planned Expiration Resolved Resolved By    None active    Resolved    COVID-19 Rule Out  20 Sheila Mccarty RN   20 Rule-Out Test Resulted    COVID-19 Rule Out 20 COVID-19 (Ordered)   20 Sheila Mccarty RN          Nurse Assessment:  Last Vital Signs: /82   Pulse 87 Temp 98.6 °F (37 °C) (Oral)   Resp 16   Ht 5' 5\" (1.651 m)   Wt 227 lb 15.3 oz (103.4 kg)   SpO2 100%   BMI 37.93 kg/m²     Last documented pain score (0-10 scale): Pain Level: 2  Last Weight:   Wt Readings from Last 1 Encounters:   20 227 lb 15.3 oz (103.4 kg)     Mental Status:  {IP PT MENTAL STATUS:80802}    IV Access:  { JUDE IV ACCESS:361303924}    Nursing Mobility/ADLs:  Walking   {CHP DME NACR:453023427}  Transfer  {CHP DME RNZC:834279512}  Bathing  {CHP DME ICJB:316919867}  Dressing  {CHP DME PEIB:598441189}  Toileting  {CHP DME SIVY:417718374}  Feeding  {[facility] DME CXSU:500446980}  Med Admin  {[facility] DME EQWE:796451684}  Med Delivery   {[facility] [place] [facility] Delivery:632198180}    Wound Care Documentation and Therapy:        Elimination:  Continence:   · Bowel: {YES / TU:62504}  · Bladder: {YES / IW:47238}  Urinary Catheter: {Urinary Catheter:251104271}   Colostomy/Ileostomy/Ileal Conduit: {YES / QY:61028}       Date of Last BM: ***  No intake or output data in the 24 hours ending 20 0914  No intake/output data recorded.     Safety Concerns:     [employer] Safety Concerns:273153370}    Impairments/Disabilities:      [employer] Impairments/Disabilities:454757461}    Nutrition Therapy:  Current Nutrition Therapy:   [employer] Diet List:882678036}    Routes of Feeding: {P DME Other Feedings:660180917}  Liquids: {Slp liquid thickness:24895}  Daily Fluid Restriction: {CHP DME Yes amt example:339639430}  Last Modified Barium Swallow with Video (Video Swallowing Test): {Done Not Done XKJU:748477566}    Treatments at the Time of Hospital Discharge:   Respiratory Treatments: ***  Oxygen Therapy:  {Therapy; copd oxygen:21647}  Ventilator:    { CC Vent ULKB:451698907}    Rehab Therapies: {THERAPEUTIC INTERVENTION:8015716962}  Weight Bearing Status/Restrictions: [address] Weight Bearin}  Other Medical Equipment (for information only, NOT a DME order):  {EQUIPMENT:595601244}  Other Treatments: ***    Patient's personal belongings (please select all that are sent with patient):  {CHP DME Belongings:320841849}    RN SIGNATURE:  {Esignature:077646037}    CASE MANAGEMENT/SOCIAL WORK SECTION    Inpatient Status Date: ***    Readmission Risk Assessment Score:  Readmission Risk              Risk of Unplanned Readmission:        0           Discharging to Facility/ Agency   · Name:   · Address:  · Phone:  · Fax:    Dialysis Facility (if applicable)   · Name:  · Address:  · Dialysis Schedule:  · Phone:  · Fax:    / signature: {Esignature:739077326}    PHYSICIAN SECTION    Prognosis: {Prognosis:4720250400}    Condition at Discharge: [address] [place] [address] Patient Condition:035927727}    Rehab Potential (if transferring to Rehab): {Prognosis:5053489365}    Recommended Labs or Other Treatments After Discharge: ***    Physician Certification: I certify the above information and transfer of Eliel Maynard  is necessary for the continuing treatment of the diagnosis listed and that she requires {Admit to Appropriate Level of Care:12148} for {GREATER/LESS:193351974} 30 days.      Update Admission H&P: {CHP DME Changes in WGUBR:404733534}    PHYSICIAN SIGNATURE:  {Esignature:997545696}

## 2021-01-14 ENCOUNTER — HOSPITAL ENCOUNTER (OUTPATIENT)
Age: 51
Setting detail: OBSERVATION
Discharge: HOME | End: 2021-01-14
Attending: EMERGENCY MEDICINE | Admitting: HOSPITALIST
Payer: COMMERCIAL

## 2021-01-14 ENCOUNTER — APPOINTMENT (OUTPATIENT)
Dept: GENERAL RADIOLOGY | Age: 51
End: 2021-01-14
Payer: COMMERCIAL

## 2021-01-14 VITALS
WEIGHT: 238 LBS | BODY MASS INDEX: 40.63 KG/M2 | HEART RATE: 77 BPM | OXYGEN SATURATION: 99 % | DIASTOLIC BLOOD PRESSURE: 83 MMHG | SYSTOLIC BLOOD PRESSURE: 126 MMHG | HEIGHT: 64 IN | TEMPERATURE: 97.7 F | RESPIRATION RATE: 17 BRPM

## 2021-01-14 DIAGNOSIS — R07.9 ACUTE CHEST PAIN: Primary | ICD-10-CM

## 2021-01-14 LAB
A/G RATIO: 1 (ref 1.1–2.2)
ALBUMIN SERPL-MCNC: 3.7 G/DL (ref 3.4–5)
ALP BLD-CCNC: 70 U/L (ref 40–129)
ALT SERPL-CCNC: 18 U/L (ref 10–40)
ANION GAP SERPL CALCULATED.3IONS-SCNC: 10 MMOL/L (ref 3–16)
AST SERPL-CCNC: 16 U/L (ref 15–37)
BASOPHILS ABSOLUTE: 0 K/UL (ref 0–0.2)
BASOPHILS RELATIVE PERCENT: 0.3 %
BILIRUB SERPL-MCNC: 0.7 MG/DL (ref 0–1)
BUN BLDV-MCNC: 12 MG/DL (ref 7–20)
CALCIUM SERPL-MCNC: 9.2 MG/DL (ref 8.3–10.6)
CHLORIDE BLD-SCNC: 103 MMOL/L (ref 99–110)
CO2: 23 MMOL/L (ref 21–32)
CREAT SERPL-MCNC: 0.7 MG/DL (ref 0.6–1.1)
D DIMER: <200 NG/ML DDU (ref 0–229)
EKG ATRIAL RATE: 83 BPM
EKG DIAGNOSIS: NORMAL
EKG P AXIS: 70 DEGREES
EKG P-R INTERVAL: 138 MS
EKG Q-T INTERVAL: 374 MS
EKG QRS DURATION: 76 MS
EKG QTC CALCULATION (BAZETT): 439 MS
EKG R AXIS: 32 DEGREES
EKG T AXIS: 63 DEGREES
EKG VENTRICULAR RATE: 83 BPM
EOSINOPHILS ABSOLUTE: 0.1 K/UL (ref 0–0.6)
EOSINOPHILS RELATIVE PERCENT: 1 %
GFR AFRICAN AMERICAN: >60
GFR NON-AFRICAN AMERICAN: >60
GLOBULIN: 3.6 G/DL
GLUCOSE BLD-MCNC: 107 MG/DL (ref 70–99)
HCG QUALITATIVE: NEGATIVE
HCT VFR BLD CALC: 44.4 % (ref 36–48)
HEMOGLOBIN: 14 G/DL (ref 12–16)
INR BLD: 1.71 (ref 0.86–1.14)
LYMPHOCYTES ABSOLUTE: 2.1 K/UL (ref 1–5.1)
LYMPHOCYTES RELATIVE PERCENT: 23.3 %
MCH RBC QN AUTO: 27 PG (ref 26–34)
MCHC RBC AUTO-ENTMCNC: 31.5 G/DL (ref 31–36)
MCV RBC AUTO: 85.8 FL (ref 80–100)
MONOCYTES ABSOLUTE: 0.8 K/UL (ref 0–1.3)
MONOCYTES RELATIVE PERCENT: 8.6 %
NEUTROPHILS ABSOLUTE: 6 K/UL (ref 1.7–7.7)
NEUTROPHILS RELATIVE PERCENT: 66.8 %
PDW BLD-RTO: 14.2 % (ref 12.4–15.4)
PLATELET # BLD: 182 K/UL (ref 135–450)
PMV BLD AUTO: 8.6 FL (ref 5–10.5)
POTASSIUM REFLEX MAGNESIUM: 4.1 MMOL/L (ref 3.5–5.1)
PROTHROMBIN TIME: 19.9 SEC (ref 10–13.2)
RBC # BLD: 5.18 M/UL (ref 4–5.2)
SODIUM BLD-SCNC: 136 MMOL/L (ref 136–145)
TOTAL PROTEIN: 7.3 G/DL (ref 6.4–8.2)
TROPONIN: <0.01 NG/ML
TROPONIN: <0.01 NG/ML
WBC # BLD: 9 K/UL (ref 4–11)

## 2021-01-14 PROCEDURE — 85610 PROTHROMBIN TIME: CPT

## 2021-01-14 PROCEDURE — 71046 X-RAY EXAM CHEST 2 VIEWS: CPT

## 2021-01-14 PROCEDURE — 84484 ASSAY OF TROPONIN QUANT: CPT

## 2021-01-14 PROCEDURE — 6370000000 HC RX 637 (ALT 250 FOR IP): Performed by: EMERGENCY MEDICINE

## 2021-01-14 PROCEDURE — G0378 HOSPITAL OBSERVATION PER HR: HCPCS

## 2021-01-14 PROCEDURE — 93005 ELECTROCARDIOGRAM TRACING: CPT | Performed by: EMERGENCY MEDICINE

## 2021-01-14 PROCEDURE — 93010 ELECTROCARDIOGRAM REPORT: CPT | Performed by: INTERNAL MEDICINE

## 2021-01-14 PROCEDURE — 2580000003 HC RX 258: Performed by: HOSPITALIST

## 2021-01-14 PROCEDURE — 6370000000 HC RX 637 (ALT 250 FOR IP): Performed by: HOSPITALIST

## 2021-01-14 PROCEDURE — 96374 THER/PROPH/DIAG INJ IV PUSH: CPT

## 2021-01-14 PROCEDURE — 85025 COMPLETE CBC W/AUTO DIFF WBC: CPT

## 2021-01-14 PROCEDURE — 85379 FIBRIN DEGRADATION QUANT: CPT

## 2021-01-14 PROCEDURE — 99285 EMERGENCY DEPT VISIT HI MDM: CPT

## 2021-01-14 PROCEDURE — 99284 EMERGENCY DEPT VISIT MOD MDM: CPT | Performed by: INTERNAL MEDICINE

## 2021-01-14 PROCEDURE — 80053 COMPREHEN METABOLIC PANEL: CPT

## 2021-01-14 PROCEDURE — 84703 CHORIONIC GONADOTROPIN ASSAY: CPT

## 2021-01-14 PROCEDURE — 6360000002 HC RX W HCPCS: Performed by: INTERNAL MEDICINE

## 2021-01-14 PROCEDURE — 36415 COLL VENOUS BLD VENIPUNCTURE: CPT

## 2021-01-14 RX ORDER — KETOROLAC TROMETHAMINE 30 MG/ML
15 INJECTION, SOLUTION INTRAMUSCULAR; INTRAVENOUS ONCE
Status: COMPLETED | OUTPATIENT
Start: 2021-01-14 | End: 2021-01-14

## 2021-01-14 RX ORDER — SODIUM CHLORIDE 0.9 % (FLUSH) 0.9 %
10 SYRINGE (ML) INJECTION PRN
Status: DISCONTINUED | OUTPATIENT
Start: 2021-01-14 | End: 2021-01-14 | Stop reason: HOSPADM

## 2021-01-14 RX ORDER — POTASSIUM CHLORIDE 7.45 MG/ML
10 INJECTION INTRAVENOUS PRN
Status: DISCONTINUED | OUTPATIENT
Start: 2021-01-14 | End: 2021-01-14 | Stop reason: HOSPADM

## 2021-01-14 RX ORDER — NITROGLYCERIN 0.4 MG/1
0.4 TABLET SUBLINGUAL EVERY 5 MIN PRN
Status: DISCONTINUED | OUTPATIENT
Start: 2021-01-14 | End: 2021-01-14 | Stop reason: HOSPADM

## 2021-01-14 RX ORDER — ACETAMINOPHEN 650 MG/1
650 SUPPOSITORY RECTAL EVERY 6 HOURS PRN
Status: DISCONTINUED | OUTPATIENT
Start: 2021-01-14 | End: 2021-01-14 | Stop reason: HOSPADM

## 2021-01-14 RX ORDER — METOPROLOL SUCCINATE 25 MG/1
25 TABLET, EXTENDED RELEASE ORAL DAILY
Status: DISCONTINUED | OUTPATIENT
Start: 2021-01-14 | End: 2021-01-14 | Stop reason: HOSPADM

## 2021-01-14 RX ORDER — ASPIRIN 81 MG/1
324 TABLET, CHEWABLE ORAL ONCE
Status: COMPLETED | OUTPATIENT
Start: 2021-01-14 | End: 2021-01-14

## 2021-01-14 RX ORDER — ONDANSETRON 2 MG/ML
4 INJECTION INTRAMUSCULAR; INTRAVENOUS EVERY 6 HOURS PRN
Status: DISCONTINUED | OUTPATIENT
Start: 2021-01-14 | End: 2021-01-14 | Stop reason: HOSPADM

## 2021-01-14 RX ORDER — PROMETHAZINE HYDROCHLORIDE 25 MG/1
12.5 TABLET ORAL EVERY 6 HOURS PRN
Status: DISCONTINUED | OUTPATIENT
Start: 2021-01-14 | End: 2021-01-14 | Stop reason: HOSPADM

## 2021-01-14 RX ORDER — MAGNESIUM SULFATE IN WATER 40 MG/ML
2 INJECTION, SOLUTION INTRAVENOUS PRN
Status: DISCONTINUED | OUTPATIENT
Start: 2021-01-14 | End: 2021-01-14 | Stop reason: HOSPADM

## 2021-01-14 RX ORDER — SODIUM CHLORIDE 0.9 % (FLUSH) 0.9 %
10 SYRINGE (ML) INJECTION EVERY 12 HOURS SCHEDULED
Status: DISCONTINUED | OUTPATIENT
Start: 2021-01-14 | End: 2021-01-14 | Stop reason: HOSPADM

## 2021-01-14 RX ORDER — POLYETHYLENE GLYCOL 3350 17 G/17G
17 POWDER, FOR SOLUTION ORAL DAILY PRN
Status: DISCONTINUED | OUTPATIENT
Start: 2021-01-14 | End: 2021-01-14 | Stop reason: HOSPADM

## 2021-01-14 RX ORDER — ASPIRIN 81 MG/1
81 TABLET, CHEWABLE ORAL DAILY
Status: DISCONTINUED | OUTPATIENT
Start: 2021-01-15 | End: 2021-01-14 | Stop reason: HOSPADM

## 2021-01-14 RX ORDER — POTASSIUM CHLORIDE 20 MEQ/1
40 TABLET, EXTENDED RELEASE ORAL PRN
Status: DISCONTINUED | OUTPATIENT
Start: 2021-01-14 | End: 2021-01-14 | Stop reason: HOSPADM

## 2021-01-14 RX ORDER — ACETAMINOPHEN 325 MG/1
650 TABLET ORAL EVERY 6 HOURS PRN
Status: DISCONTINUED | OUTPATIENT
Start: 2021-01-14 | End: 2021-01-14 | Stop reason: HOSPADM

## 2021-01-14 RX ORDER — MORPHINE SULFATE 2 MG/ML
2 INJECTION, SOLUTION INTRAMUSCULAR; INTRAVENOUS EVERY 4 HOURS PRN
Status: DISCONTINUED | OUTPATIENT
Start: 2021-01-14 | End: 2021-01-14 | Stop reason: HOSPADM

## 2021-01-14 RX ADMIN — Medication 10 ML: at 08:29

## 2021-01-14 RX ADMIN — ASPIRIN 324 MG: 81 TABLET, CHEWABLE ORAL at 05:46

## 2021-01-14 RX ADMIN — ACETAMINOPHEN 650 MG: 325 TABLET ORAL at 10:26

## 2021-01-14 RX ADMIN — NITROGLYCERIN 1 INCH: 20 OINTMENT TOPICAL at 05:46

## 2021-01-14 RX ADMIN — METOPROLOL SUCCINATE 25 MG: 25 TABLET, EXTENDED RELEASE ORAL at 11:49

## 2021-01-14 RX ADMIN — KETOROLAC TROMETHAMINE 15 MG: 30 INJECTION, SOLUTION INTRAMUSCULAR at 11:46

## 2021-01-14 ASSESSMENT — PAIN DESCRIPTION - DESCRIPTORS: DESCRIPTORS: TENDER

## 2021-01-14 ASSESSMENT — PAIN SCALES - GENERAL
PAINLEVEL_OUTOF10: 4
PAINLEVEL_OUTOF10: 8

## 2021-01-14 ASSESSMENT — PAIN DESCRIPTION - ORIENTATION: ORIENTATION: RIGHT

## 2021-01-14 ASSESSMENT — PAIN DESCRIPTION - LOCATION: LOCATION: CHEST

## 2021-01-14 ASSESSMENT — PAIN DESCRIPTION - PAIN TYPE: TYPE: ACUTE PAIN

## 2021-01-14 NOTE — H&P
_    [facility]    1/14/2021 5:52 AM    Patient Information:  Jason Vick is a 48 y.o. female [id number]    PCP:  Ciara Arguelles (Tel: [phone] )    Date of Service:   Pt seen/examined on 1/14/2021   Placed in Observation. Chief complaint:    Chief Complaint   Patient presents with    Chest Pain     pt felt heaviness tonight in her chest. Denies any N/V. Also feels some shortness of breath       History of Present Illness:  Sheila Sylvester is a 48 y.o. female who presented with   · Reports on and off Chest pain , w/ exertion , that is normal for her per patient . She feels this heaviness on her chest when she does walking but it resolves on its own w/o any medications . This has been going on and off X few months . · Last evening when she came back from her walk , she started having substernal chest Pressure again but this time it remained persistent and since s/s did not improve during the whole night, she came to ED for evaluation   · Has a past h/o PE in past   · No recent Travel   · No sick contacts   · No F/C   · No cough s/s       History and pertinent information obtained from   · ED provider   · Prior Chart    · Patient         Notable/Significant ED Findings/VItals :   · VSS        While in ED  · Patient care Given. Monitoring done. See Epic for details    · Pertinent Labs done. See [facility] for details   · Acute issues managed . See Epic for details    · Imaging  CXR ,  done in ED . Acute issues/Dx, noted as below. While in ED, Indicated/Pertinent Medications/Care given as below      · ED Chart reviewed. Medications reviewed w/c were give in ED . Imaging done in ED reviewed and results noted. See Epic for details on medications and orders . ·       · Imaging done in ED as below. And is/are  s/o No acute findings or is unremarkable for any acute pathology needing acute interventions, on review.         · Pertinent Abnormal Labs and their interpretation/active and acute issues as mentioned below. Currently, on my evaluation, patient is :   · Since arrival, post above Rx, patient is AO X 3   · Despite above Rx in E , she remains w/ pressure like sensation on her chest and not much improvement since arrival   · No hypoxemia noted     Is Breathing comfortably on current O2 needs and no distress noted . REVIEW OF SYSTEMS:      10 complete review of symptoms performed. Pertinent positives and negatives listed above in HPI. Past Medical History:         has a past medical history of Hypertension and Pulmonary embolism ([facility] [address] Past Surgical History:         has a past surgical history that includes Cholecystectomy and craniotomy (Left, 9/21/2020). Medications:        Current Outpatient Medications on File Prior to Encounter   Medication Sig Dispense Refill    rivaroxaban (XARELTO) 20 MG TABS tablet Take 20 mg by mouth daily (with breakfast)      metoprolol succinate (TOPROL XL) 25 MG extended release tablet Take 1 tablet by mouth daily 30 tablet 3    sennosides-docusate sodium (SENOKOT-S) 8.6-50 MG tablet Take 2 tablets by mouth daily 30 tablet 0    aspirin 81 MG chewable tablet Take 1 tablet by mouth daily 30 tablet 3    levETIRAcetam (KEPPRA) 500 MG tablet Take 1 tablet by mouth 2 times daily 60 tablet 3    atorvastatin (LIPITOR) 80 MG tablet Take 1 tablet by mouth nightly 30 tablet 3         Allergies:  No Known Allergies       Social History:   reports that she has never smoked. She has never used smokeless tobacco. She reports previous alcohol use. She reports previous drug use. Drug: Marijuana. Family History:          History reviewed. No pertinent family history. Physical Exam:  BP (!) 166/109   Pulse 69   Temp 96.7 °F (35.9 °C) (Temporal)   Resp 21   Ht 5' 4\" (1.626 m)   Wt 238 lb (108 kg)   LMP 12/24/2020   SpO2 100%   BMI 40.85 kg/m²     General appearance: Appears  comfortable.   Well nourished   Eyes:  Sclera clear, pupils equal  ENT:  Moist mucus membranes, Trachea midline. Cardiovascular: Regular rhythm, normal S1, S2. No edema in lower extremities   Respiratory:  Noted Clear to auscultation bilaterally,  No wheeze, good inspiratory effort   Gastrointestinal:  Abdomen soft,  non-tender,  not distended  Musculoskeletal:  No cyanosis in digits, neck supple  Neurology:  Cranial nerves grossly intact. Alert and oriented in time, place and person. No speech or motor deficits   Psychiatry:  Appropriate affect. Not agitated  Skin:  Warm, dry, normal turgor, no rash       Labs:     Recent Labs     01/14/21 0452   WBC 9.0   HGB 14.0   HCT 44.4        Recent Labs     01/14/21 0452      K 4.1      CO2 23   BUN 12   CREATININE 0.7   CALCIUM 9.2     Recent Labs     01/14/21 0452   AST 16   ALT 18   BILITOT 0.7   ALKPHOS 70     Recent Labs     01/14/21 0452   INR 1.71*     Recent Labs     01/14/21 0452   TROPONINI <0.01         Urinalysis:    No results found for: Josette Guzmán, BACTERIA, RBCUA, BLOODU, [address]      Radiology:     CXR:   I have reviewed the CXR  No acute findings or is unremarkable for any acute pathology needing acute interventions, on review. EKG/Telemonitor :    I have reviewed the EKG  NSR   Non sp ST T changes +     IMAGING :     XR CHEST (2 VW)   Final Result   No evidence of acute process. PROBLEM LIST      Active Hospital Problems    Diagnosis Date Noted    Chest pain [R07.9] 01/14/2021       PLAN/ORDERS FOR THIS ADMISSION/HOSPITALIZATION       · Chest pain , r/o ACS . No hypoxemia . No F/C . CXR shows no PNA . => Will continue cardiac monitor while inpatient. Admission EKG reviewed . EKG prn chest pain. Cardiac enzymes on admission in ED noted per Labs and will trend cardiac enzymes further while inpatient. => Medication Plan for now will be as follows .  Started ASA QD  +  NTG Prn  . Planned cardiology c/s for further recommendations for testing since Patient is still having s/s of Chest pressure in ED @ rest . Defer further testing recommendations to Cards evaluation in AM    · H/o PE . Resumed home Xarelto PO QD . Check D dimer X 1 as well   · H/o Hypercoagulable d/o in family and patient is a carrier for same . · Essential Hypertension . Resumed Home medication of BB   · Obesity III . Noted Body mass index is 40.85 kg/m². .     ·   Home medications for chronic medical problems  reviewed and Held / Resumed / Pertinent changes made [as mentioned above] in home medications, as clinically warranted/Indicated . See EPIC orders for details         · DVT Prophylaxis : Xarelto PO QD   ; + SCDs   · Nutrition/Diet: Diet NPO Effective Now  · Code Status: Full Code  · PT/OT and ambulatory Eval Status: Ambulate with Assist    · Probable LOS & future Disposition planned post discharge  - home in 1-2 days       Please see EPIC orders for detailed orders/recommendations of plan and medications. CONSULTS ORDERED @ ADMISSION   IP CONSULT TO HOSPITALIST      Medical Decision Making : HIGH     Total patient  Care [ Direct and Indirect ] time spent in evaluating the patient an discussing plan with appropriate staff/patient/family members is 5 2 min       Kingsley Dejesus MD    Hospitalist, [facility].    1/14/2021 5:52 AM

## 2021-01-14 NOTE — Clinical Note
Patient Class: Observation [104]   REQUIRED: Diagnosis: Chest pain [854545]   Estimated Length of Stay: Estimated stay of less than 2 midnights   Admitting Provider: [facility] [[id number]]   Telemetry Bed Required?: Yes

## 2021-01-14 NOTE — CARE COORDINATION
Patient admitted as Observation/OPIB with an anticipated short hospitalization length of stay. Chart reviewed and it appears that patient has minimal needs for discharge at this time. Discussed with patients nurse and requested that case management be notified if discharge needs are identified. *Case management will continue to follow progress and update discharge plan as needed.     Electronically signed by INNA Lance LSW on 1/14/2021 at 11:48 AM

## 2021-01-14 NOTE — PLAN OF CARE
Problem: Pain:  Goal: Pain level will decrease  Description: Pain level will decrease  1/14/2021 1143 by Nasim Callejas RN  Outcome: Completed  1/14/2021 1052 by Nasim Callejas RN  Outcome: Ongoing  Goal: Control of acute pain  Description: Control of acute pain  1/14/2021 1143 by aNsim Callejas RN  Outcome: Completed  1/14/2021 1052 by Nasim Callejas RN  Outcome: Ongoing  Goal: Control of chronic pain  Description: Control of chronic pain  Outcome: Completed     Problem: Falls - Risk of:  Goal: Will remain free from falls  Description: Will remain free from falls  Outcome: Completed  Goal: Absence of physical injury  Description: Absence of physical injury  Outcome: Completed

## 2021-01-14 NOTE — PROGRESS NOTES
Pt is dc. Is following up with her PCP and getting a stress test done through [facility]. PIV removed. Tele removed. AVS signed. VSS. No complaints at this time.

## 2021-01-14 NOTE — ED PROVIDER NOTES
and Sexual Activity    Alcohol use: Not Currently     Comment: occ    Drug use: Not Currently     Types: Marijuana    Sexual activity: Not on file   Lifestyle    Physical activity     Days per week: Not on file     Minutes per session: Not on file    Stress: Not on file   Relationships    Social connections     Talks on phone: Not on file     Gets together: Not on file     Attends Cheondoism service: Not on file     Active member of club or organization: Not on file     Attends meetings of clubs or organizations: Not on file     Relationship status: Not on file    Intimate partner violence     Fear of current or ex partner: Not on file     Emotionally abused: Not on file     Physically abused: Not on file     Forced sexual activity: Not on file   Other Topics Concern    Not on file   Social History Narrative    Not on file     Current Facility-Administered Medications   Medication Dose Route Frequency Provider Last Rate Last Admin    aspirin chewable tablet 324 mg  324 mg Oral Once Chyna Gamino MD        nitroglycerin (NITRO-BID) 2 % ointment 1 inch  1 inch Topical Once Chyna Gamino MD         Current Outpatient Medications   Medication Sig Dispense Refill    rivaroxaban (XARELTO) 20 MG TABS tablet Take 20 mg by mouth daily (with breakfast)      metoprolol succinate (TOPROL XL) 25 MG extended release tablet Take 1 tablet by mouth daily 30 tablet 3    sennosides-docusate sodium (SENOKOT-S) 8.6-50 MG tablet Take 2 tablets by mouth daily 30 tablet 0    aspirin 81 MG chewable tablet Take 1 tablet by mouth daily 30 tablet 3    levETIRAcetam (KEPPRA) 500 MG tablet Take 1 tablet by mouth 2 times daily 60 tablet 3    atorvastatin (LIPITOR) 80 MG tablet Take 1 tablet by mouth nightly 30 tablet 3     No Known Allergies    REVIEW OF SYSTEMS  10 systems reviewed, pertinent positives per HPI otherwise noted to be negative.     PHYSICAL EXAM  BP (!) 166/109   Pulse 69   Temp 96.7 °F (35.9 °C) (Temporal)   Resp 21   Ht 5' 4\" (1.626 m)   Wt 238 lb (108 kg)   LMP 12/24/2020   SpO2 100%   BMI 40.85 kg/m²    GENERAL APPEARANCE: Awake and alert. Cooperative. No distress. HENT: Normocephalic. Atraumatic. Mucous membranes are moist.  NECK: Supple. EYES: PERRL. EOM's grossly intact. HEART/CHEST: RRR. No murmurs. No chest wall tenderness. LUNGS: Respirations unlabored. CTAB. Good air exchange. Speaking comfortably in full sentences. ABDOMEN: No tenderness. Soft. Non-distended. No masses. No organomegaly. No guarding or rebound. Normal bowel sounds throughout. MUSCULOSKELETAL: No extremity edema. Compartments soft. No deformity. No tenderness in the extremities. All extremities neurovascularly intact. SKIN: Warm and dry. No acute rashes. NEUROLOGICAL: Alert and oriented. CN's 2-12 intact. No gross facial drooping. Strength 5/5, sensation intact. 2 plus DTR's in knees bilaterally. Gait normal.  PSYCHIATRIC: Normal mood and affect. LABS  I have reviewed all labs for this visit.    Results for orders placed or performed during the hospital encounter of 01/14/21   Troponin   Result Value Ref Range    Troponin <0.01 <0.01 ng/mL   CBC Auto Differential   Result Value Ref Range    WBC 9.0 4.0 - 11.0 K/uL    RBC 5.18 4.00 - 5.20 M/uL    Hemoglobin 14.0 12.0 - 16.0 g/dL    Hematocrit 44.4 36.0 - 48.0 %    MCV 85.8 80.0 - 100.0 fL    MCH 27.0 26.0 - 34.0 pg    MCHC 31.5 31.0 - 36.0 g/dL    RDW 14.2 12.4 - 15.4 %    Platelets 760 876 - 604 K/uL    MPV 8.6 5.0 - 10.5 fL    Neutrophils % 66.8 %    Lymphocytes % 23.3 %    Monocytes % 8.6 %    Eosinophils % 1.0 %    Basophils % 0.3 %    Neutrophils Absolute 6.0 1.7 - 7.7 K/uL    Lymphocytes Absolute 2.1 1.0 - 5.1 K/uL    Monocytes Absolute 0.8 0.0 - 1.3 K/uL    Eosinophils Absolute 0.1 0.0 - 0.6 K/uL    Basophils Absolute 0.0 0.0 - 0.2 K/uL   HCG Qualitative, Serum   Result Value Ref Range    hCG Qual Negative Detects HCG level >10 MIU/mL   Protime-INR Result Value Ref Range    Protime 19.9 (H) 10.0 - 13.2 sec    INR 1.71 (H) 0.86 - 1.14   Comprehensive Metabolic Panel w/ Reflex to MG   Result Value Ref Range    Sodium 136 136 - 145 mmol/L    Potassium reflex Magnesium 4.1 3.5 - 5.1 mmol/L    Chloride 103 99 - 110 mmol/L    CO2 23 21 - 32 mmol/L    Anion Gap 10 3 - 16    Glucose 107 (H) 70 - 99 mg/dL    BUN 12 7 - 20 mg/dL    CREATININE 0.7 0.6 - 1.1 mg/dL    GFR Non-African American >60 >60    GFR African American >60 >60    Calcium 9.2 8.3 - 10.6 mg/dL    Total Protein 7.3 6.4 - 8.2 g/dL    Alb 3.7 3.4 - 5.0 g/dL    Albumin/Globulin Ratio 1.0 (L) 1.1 - 2.2    Total Bilirubin 0.7 0.0 - 1.0 mg/dL    Alkaline Phosphatase 70 40 - 129 U/L    ALT 18 10 - 40 U/L    AST 16 15 - 37 U/L    Globulin 3.6 g/dL   EKG 12 Lead   Result Value Ref Range    Ventricular Rate 83 BPM    Atrial Rate 83 BPM    P-R Interval 138 ms    QRS Duration 76 ms    Q-T Interval 374 ms    QTc Calculation (Bazett) 439 ms    P Axis 70 degrees    R Axis 32 degrees    T Axis 63 degrees    Diagnosis       Normal sinus rhythmNonspecific ST and T wave abnormality     The 12 lead EKG was interpreted by me as follows:  Rate: bradycardia with a rate of 83  Rhythm: sinus  Axis: normal  Intervals: normal ND, narrow QRS, normal QTc  ST segments: no ST elevations or depressions  T waves: no abnormal inversions  Non-specific T wave changes: present  Prior EKG comparison: EKG dated 9/17/20 is not significantly different    RADIOLOGY    Xr Chest (2 Vw)    Result Date: 1/14/2021  EXAMINATION: TWO XRAY VIEWS OF THE CHEST 1/14/2021 3:53 am COMPARISON: CT chest 09/17/2020 HISTORY: ORDERING SYSTEM PROVIDED HISTORY: chest pain TECHNOLOGIST PROVIDED HISTORY: Reason for exam:->chest pain Reason for Exam: chest pain and shortness of breath Acuity: Acute Type of Exam: Initial Relevant Medical/Surgical History: previous P.E. and  hypertension FINDINGS: Normal heart size and pulmonary vasculature.   No focal consolidations, pleural effusions, or pneumothorax. No evidence of acute process. ED COURSE/MDM  Patient seen and evaluated. Old records reviewed. Labs and imaging reviewed and results discussed with patient. After initial evaluation, differential diagnostic considerations included: acute coronary syndrome, pulmonary embolism, COPD/asthma, pneumonia, musculoskeletal, reflux/PUD/gastritis, pneumothorax, CHF, thoracic aortic dissection, anxiety    The patient's ED workup was notable for acute chest pain and pressure sensation, concern for angina, compliant with anticoagulation with no tachycardia hypoxia or other indications of PE at this time, chest x-ray clear, troponin initially negative, heart score is 5 with initially negative troponin. No previous cardiac work-ups. Will admit for ACS rule out. Aspirin and nitroglycerin ordered. HEART SCORE:    History: 2  EC  Patient Age: 1  *Risk factors for Atherosclerotic disease: Hypertension;Obesity  Risk Factors: 1  Troponin: 0  Heart Score Total: 5      Heart score: 5. This falls under the following category: Score of 4-6, which indicates low/moderate risk for major adverse cardiac event and supports observation with repeated troponins and/or non-invasive testing      During the patient's ED course, the patient was given:  Medications   aspirin chewable tablet 324 mg (has no administration in time range)   nitroglycerin (NITRO-BID) 2 % ointment 1 inch (has no administration in time range)        CLINICAL IMPRESSION  1. Acute chest pain        Blood pressure (!) 166/109, pulse 69, temperature 96.7 °F (35.9 °C), temperature source Temporal, resp. rate 21, height 5' 4\" (1.626 m), weight 238 lb (108 kg), last menstrual period 2020, SpO2 100 %. DISPOSITION  Nava Garcia was admitted in fair condition. The plan is to admit to the hospital at this time under the hospitalist service.   Dr. Barbara Mcnulty accepted the patient and will take over the patient's

## 2021-01-14 NOTE — DISCHARGE SUMMARY
[facility]    Patient Demographics    Patient. Nava Garcia  Date of Birth. 1970  MRN. [id number]     Primary care provider. Roxana Beaver  (Tel: [phone])    Admit date: 1/14/2021    Discharge date (blank if same as Note Date): 1/14/2021  Note Date: 1/14/2021     Reason for Hospitalization. Chief Complaint   Patient presents with    Chest Pain     pt felt heaviness tonight in her chest. Denies any N/V. Also feels some shortness of breath         Significant Findings. Active Problems:    Chest pain       Problems and results from this hospitalization that need follow up. 1. Chest pain-needs outpatient stress test    Significant test results and incidental findings. 1. D dimer <200  No evidence of acute process. Invasive procedures and treatments. 1. None     Problem-based Hospital Course. Patient is a 70-year-old -American female who presented to hospital with sternal chest pain. It started at 6 PM on January 13. It has been present since then and has now gone to her left shoulder. Initial cardiac work-up in the emergency room was unremarkable. Serial cardiac enzymes were normal.  She was seen by cardiology who recommended a stress test versus cardiac CTA. At this stage patient did not want to stay to have this work-up completed and was adamant about following up with her primary care physician to have this done outpatient. At this stage it seems that her chest pain is atypical.  She did have a D-dimer which was within normal limits. She is on Xarelto. She was discharged home in stable condition with follow-up with her primary care physician Dr. Prakash Porter. IP CONSULT TO HOSPITALIST  IP CONSULT TO CARDIOLOGY  IP CONSULT TO CARDIOLOGY    Physical examination on discharge day.    /83   Pulse 77   Temp 97.7 °F (36.5 °C) (Oral)   Resp 17   Ht 5' 4\" (1.626 m)   Wt 238 lb (108 kg)   LMP 12/24/2020   SpO2 99%   BMI 40.85 kg/m²   General appearance. Alert. Looks comfortable. HEENT. Sclera clear. Moist mucus membranes. Cardiovascular. Regular rate and rhythm, normal S1, S2. No murmur. Respiratory. Not using accessory muscles. Clear to auscultation bilaterally, no wheeze. Gastrointestinal. Abdomen obese, soft, non-tender, not distended, normal bowel sounds  Neurology. Facial symmetry. No speech deficits. Moving all extremities equally. Extremities. No edema in lower extremities. Skin. Warm, dry, normal turgor    Condition at time of discharge stable    Medication instructions provided to patient at discharge. Medication List      CONTINUE taking these medications    atorvastatin 80 MG tablet  Commonly known as: LIPITOR  Take 1 tablet by mouth nightly     metoprolol succinate 25 MG extended release tablet  Commonly known as: TOPROL XL  Take 1 tablet by mouth daily     Xarelto 20 MG Tabs tablet  Generic drug: rivaroxaban        STOP taking these medications    aspirin 81 MG chewable tablet     levETIRAcetam 500 MG tablet  Commonly known as: KEPPRA     sennosides-docusate sodium 8.6-50 MG tablet  Commonly known as: SENOKOT-S            Discharge recommendations given to patient. Follow Up. Dr. Rosibel Jaramillo within 1 week. Disposition. home  Activity. activity as tolerated      Spent 32 minutes in discharge process. Signed:   Burns Hashimoto, PA-C     1/14/2021 3:56 PM

## 2021-01-14 NOTE — CONSULTS
[facility]  [phone]        Reason for Consultation/Chief Complaint: \" Chest pain. \"    History of Present Illness:  Cam Deal is a 48 y.o. patient who presented to the hospital with complaints of chest pain. Mid-sternal chest discomfort, like someone sitting on her chest, non-radiating. No associated shortness of breath, nausea or diaphoresis. Started around 6 pm yesterday and has been present since then. No aggravating or alleviating factors. She states that she had been working out and did burpies. Past Medical History:   has a past medical history of Hypertension and Pulmonary embolism ([address] Surgical History:   has a past surgical history that includes Cholecystectomy and craniotomy (Left, 9/21/2020). Social History:   reports that she has never smoked. She has never used smokeless tobacco. She reports previous alcohol use. She reports previous drug use. Drug: Marijuana. Family History:  family history is not on file. Home Medications:  Were reviewed and are listed in nursing record. and/or listed below  Prior to Admission medications    Medication Sig Start Date End Date Taking? Authorizing Provider   rivaroxaban (XARELTO) 20 MG TABS tablet Take 20 mg by mouth daily (with breakfast)   Yes Historical Provider, MD   atorvastatin (LIPITOR) 80 MG tablet Take 1 tablet by mouth nightly 9/19/20  Yes Adam Abraham MD   metoprolol succinate (TOPROL XL) 25 MG extended release tablet Take 1 tablet by mouth daily 9/20/20  Yes Adam Abraham MD   sennosides-docusate sodium (SENOKOT-S) 8.6-50 MG tablet Take 2 tablets by mouth daily 9/25/20   Adam Abraham MD   aspirin 81 MG chewable tablet Take 1 tablet by mouth daily 9/24/20   Adam Abraham MD   levETIRAcetam (KEPPRA) 500 MG tablet Take 1 tablet by mouth 2 times daily 9/19/20   Adam Abraham MD        Allergies:  Patient has no known allergies.      Review of Systems:   A complete review of systems has been reviewed and updated today and is negative except as noted in the history of present illness.       Physical Examination:    Vitals:    01/14/21 0800   BP: 126/83   Pulse: 77   Resp: 17   Temp:    SpO2: 99%    Weight: 238 lb (108 kg)         General Appearance:  Alert, cooperative, no distress, appears stated age   Head:  Normocephalic, without obvious abnormality, atraumatic   Eyes:  EOMI, conjunctiva/corneas clear       Nose: Nares normal   Throat: Lips normal   Neck: Supple, symmetrical, trachea midline,  no carotid bruit or JVD       Lungs:   Clear to auscultation bilaterally, respirations unlabored   Chest Wall:  No tenderness or deformity   Heart:  Regular rate and rhythm, S1, S2 normal, no murmur, rub or gallop   Abdomen:   Soft, non-tender, bowel sounds active all four quadrants,  no masses, no organomegaly           Extremities: Extremities normal, atraumatic, no cyanosis or edema   Pulses: 2+ and symmetric   Skin: Skin color, texture, turgor normal, no rashes or lesions   Pysch: Normal mood and affect   Neurologic: Normal gross motor and sensory exam.         Labs  CBC:   Lab Results   Component Value Date    WBC 9.0 01/14/2021    RBC 5.18 01/14/2021    HGB 14.0 01/14/2021    HCT 44.4 01/14/2021    MCV 85.8 01/14/2021    RDW 14.2 01/14/2021     01/14/2021     CMP:    Lab Results   Component Value Date     01/14/2021    K 4.1 01/14/2021     01/14/2021    CO2 23 01/14/2021    BUN 12 01/14/2021    CREATININE 0.7 01/14/2021    GFRAA >60 01/14/2021    AGRATIO 1.0 01/14/2021    LABGLOM >60 01/14/2021    GLUCOSE 107 01/14/2021    PROT 7.3 01/14/2021    CALCIUM 9.2 01/14/2021    BILITOT 0.7 01/14/2021    ALKPHOS 70 01/14/2021    AST 16 01/14/2021    ALT 18 01/14/2021     LIPIDS: No components found for: TOTAL CHOLESTEROL,  HDL,  LDL,  TRIGLYCERIDES  PT/INR:  No results found for: PTINR  Lab Results   Component Value Date    TROPONINI <0.01 01/14/2021       EKG:  I have reviewed EKG with the following interpretation:  Impression:    14-JAN-2021 03:22:04 [facility] ROUTINE RECORD  Normal sinus rhythm  Nonspecific ST and T wave abnormality    Imaging/Procedures:   CTPA 9/17/2020: Motion limited.  No central pulmonary embolus identified.  No pneumonia or   edema.  No pneumothorax. Assessment/Plan:  Active Problems:    Chest pain  Plan: Mixed features. Recommend. She states that she can have her primary care physician at [facility], Dr. Surya Chavarria, ordered stress test..  Did discuss alternatives such as coronary CTA. Patient does not want to stay any longer. Patient was made aware of potential for harm without completing full work-up. Patient is aware and states that she wants to still go home. Thank you for allowing us to participate in the care of [employer]. Further evaluation will be based upon the patient's clinical course and testing results. All questions and concerns were addressed to the patient/family. Alternatives to my treatment were discussed. The note was completed using EMR. Every effort was made to ensure accuracy; however, inadvertent computerized transcription errors may be present.     King Begum M.D.

## 2021-03-22 ENCOUNTER — HOSPITAL ENCOUNTER (OUTPATIENT)
Dept: MRI IMAGING | Age: 51
Discharge: HOME | End: 2021-03-22
Payer: COMMERCIAL

## 2021-03-22 DIAGNOSIS — D18.00 CAVERNOUS HEMANGIOMA: ICD-10-CM

## 2021-03-22 PROCEDURE — 70551 MRI BRAIN STEM W/O DYE: CPT

## 2022-04-15 NOTE — PROGRESS NOTES
Pt is alert and oriented. VSS with the exception of elevated B/P at times. Neuro checks are WDL. Pt ambulated to the bathroom and tolerates well. Pt does have flat affect and can be impulsive when needing to get out of bed. no

## 2025-04-20 NOTE — PROGRESS NOTES
Physical Therapy    Facility/Department: [facility] ICU  Initial Assessment and DISCHARGE    NAME: Natalee Maria  : 1970  MRN: [id number]    Date of Service: 2020    Discharge Recommendations:  Natalee Maria scored a 24/24 on the AM-PAC short mobility form. At this time, no further PT is recommended upon discharge. Recommend patient returns to prior setting with prior services. PT Equipment Recommendations  Equipment Needed: No    Assessment   Assessment: Pt from home with brain bleed and HTN. No neuro symptoms noted. Pt demonstrates independence with all mobility and steady gait. No other acute PT needs identified. Will sign off. Decision Making: Medium Complexity  Patient Education: Role of PT. Pt verbalized understanding. REQUIRES PT FOLLOW UP: No         Restrictions  Up with assist     Vision/Hearing  Vision: Within Functional Limits  Hearing: Within functional limits       Subjective  Chart Reviewed: Yes  Additional Pertinent Hx: Pt to [id number] [facility]  with stroke like symptoms. Head CT: (+) Left frontal lobe ICH. CTA head/neck: no stenosis, cerebral aneurysm or AVM. Transferred to ICU at [facility]. PMH:  None per chart  Diagnosis: Brain bleed    Subjective  Subjective: Pt found supine in bed. Pt pleasant and agreeable for PT. \"I work out 5 days a week. \"  Pain Screening  Patient Currently in Pain: Denies    Orientation  Within Functional Limits    Social/Functional History  Lives With: Alone  Type of Home: Apartment  Home Layout: One level  Home Access: (3 flights of steps to apartment level)  Bathroom Shower/Tub: Walk-in shower  Bathroom Toilet: Standard(leverage beside toilet)  Bathroom Accessibility: Accessible  Home Equipment: (none)  ADL Assistance: Independent  Homemaking Assistance: Independent  Ambulation Assistance: Independent  Transfer Assistance: Independent  Active : Yes  Type of occupation: working from [facility] [employer] [address]: [employer]; goes to the gym  IADL Comments: does own grocery shopping  Additional Comments: denies falls      Objective  Strength  Strength RLE: WFL  Strength LLE: WFL    Bed mobility  Supine to Sit: Independent  Sit to Supine: Independent     Transfers  Sit to Stand: Independent  Stand to sit: Independent     Ambulation  Device: No Device  Assistance: Independent  Quality of Gait: steady gait  Gait Deviations: None  Distance: 40ft  Comments: In room due to COVID rule out    Stairs/Curb  Stairs?: No(Unable to due to COVID rule out.   Pt voices no concerns about being able to manage stairs at home.)    Balance  Sitting - Static: Good  Sitting - Dynamic: Good  Standing - Static: Good  Standing - Dynamic: Good    Treatment included gait and transfer training with patient education     Plan: Discharge acute PT      Safety Devices  Type of devices: Left in bed, Call light within reach, Nurse notified      AM-PAC Score  AM-PAC Inpatient Mobility Raw Score : 24 (09/18/20 1047)  AM-PAC Inpatient T-Scale Score : 61.14 (09/18/20 1047)  Mobility Inpatient CMS 0-100% Score: 0 (09/18/20 1047)  Mobility Inpatient CMS G-Code Modifier : [address] (09/18/20 1047)    Therapy Time   Individual Concurrent Group Co-treatment   Time In 9185         Time Out 1034         Minutes 40         Timed Code Treatment Minutes:  25  Total Treatment Minutes:  24 Erica Pinon, PT 36-year-old female with history of uterine fibroid with heavy menstruation complaining feeling lightheaded, increasing weakness today.  Patient with menstruation for  past 2 days.  Patient with near syncope mostly due to heavy menstruation that leads to hypotension.  Will get labs, give IV fluid, transfuse as needed.  Unlikely patient with sepsis or ACS

## (undated) DEVICE — SPONGE GZ W4XL8IN COT WVN 12 PLY

## (undated) DEVICE — BLADE CLIPPER SURG SENSICLIP

## (undated) DEVICE — 3L THIN WALL CAN: Brand: CRD

## (undated) DEVICE — DRESSING GERM DIA1IN CNTR H DIA7MM BLU CHG ANTIMIC PROTCT

## (undated) DEVICE — SYRINGE MED 10ML TRNSLUC BRL PLUNG BLK MRK POLYPR CTRL

## (undated) DEVICE — TOOL F2/8TA23 LEGEND 8CM 2.3MM TAPER: Brand: MIDAS REX™

## (undated) DEVICE — ADHESIVE SKIN CLSR 0.7ML TOP DERMBND ADV

## (undated) DEVICE — THE VYCOR VIEWSITE BRAIN ACCESS SYSTEM (VBAS) IS A FAMILY OF RETRACTOR AND BRAIN ACCESS DEVICES OF VARYING SHAPES AND SIZES DESIGNED FOR PROVIDING DIAGNOSTIC AND SURGICAL ACCESS TO VARIOUS PORTIONS OF THE BRAIN AND SPINE. IT HAS BEEN DESIGNED TO SERVE AS A SELF-RETAINING RETRACTOR SYSTEM FOR BRAIN TISSUE. IT IS COMPRISED OF AN INTRODUCER AND A WORKING CHANNEL OR PORT THAT ARE SECURED BY A SPRING-LOADED LATCH. EACH DEVICE ALLOWS FOR THE GENTLE RETRACTION OF TISSUE, VISUALIZATION OF THE SURGICAL SITE AND FOR THE SMOOTH MANIPULATION OF INTRODUCED INSTRUMENTS. THE RANGE OF DEVICE SIZES (WIDTH AND LENGTH) PROVIDES VARIOUS WORKING CHANNEL SIZES.: Brand: VYCOR VIEWSITE BRAIN ACCESS SYSTEM

## (undated) DEVICE — PRECISION SPECIMEN CONTAINER 4 OZ (118 ML) • POSITIVE SEAL INDICATOR OR PACKAGED: Brand: PRECISION

## (undated) DEVICE — PAD DRY FLOOR ABS 32X58IN GRN

## (undated) DEVICE — JEWISH HOSPITAL TURNOVER KIT: Brand: MEDLINE INDUSTRIES, INC.

## (undated) DEVICE — COVER LT HNDL CAM BLU DISP W/ SURG CTRL

## (undated) DEVICE — SOLUTION IV 1000ML 0.9% SOD CHL

## (undated) DEVICE — SUTURE MCRYL SZ 4-0 L27IN ABSRB UD L19MM PS-2 1/2 CIR PRIM Y426H

## (undated) DEVICE — 3M™ STERI-STRIP™ REINFORCED ADHESIVE SKIN CLOSURES, R1548, 1 IN X 5 IN (25 MM X 125 MM), 4 STRIPS/ENVELOPE: Brand: 3M™ STERI-STRIP™

## (undated) DEVICE — SUTURE NRLN SZ 4-0 L18IN NONABSORBABLE BLK L13MM TF 1/2 CIR C584D

## (undated) DEVICE — SYRINGE MED 3ML CLR PLAS STD N CTRL LUERLOCK TIP DISP

## (undated) DEVICE — TOOL 10BA50-MN LEGEND 10CM 5MM BA: Brand: MIDAS REX™

## (undated) DEVICE — JEWISH CRANI PACK: Brand: MEDLINE INDUSTRIES, INC.

## (undated) DEVICE — STAPLER SKIN H3.9MM WIRE DIA0.58MM CRWN 6.9MM 35 STPL ROT

## (undated) DEVICE — SURGICAL SET UP - SURE SET: Brand: MEDLINE INDUSTRIES, INC.

## (undated) DEVICE — PAD,NON-ADHERENT,3X8,STERILE,LF,1/PK: Brand: MEDLINE

## (undated) DEVICE — GARMENT,MEDLINE,DVT,INT,CALF,MED, GEN2: Brand: MEDLINE

## (undated) DEVICE — CABLE BPLR L12FT FLYING LD DISPOSABLE

## (undated) DEVICE — PLATE ES AD W 9FT CRD 2

## (undated) DEVICE — SYRINGE CATH TIP 50ML

## (undated) DEVICE — APPLICATOR MEDICATED 26 CC SOLUTION HI LT ORNG CHLORAPREP

## (undated) DEVICE — SURE SET-DOUBLE BASIN-LF: Brand: MEDLINE INDUSTRIES, INC.

## (undated) DEVICE — SUTURE VCRL SZ 2-0 L18IN ABSRB VLT L26MM SH 1/2 CIR J775D